# Patient Record
Sex: FEMALE | Race: BLACK OR AFRICAN AMERICAN | NOT HISPANIC OR LATINO | Employment: OTHER | URBAN - METROPOLITAN AREA
[De-identification: names, ages, dates, MRNs, and addresses within clinical notes are randomized per-mention and may not be internally consistent; named-entity substitution may affect disease eponyms.]

---

## 2020-02-05 ENCOUNTER — HOSPITAL ENCOUNTER (EMERGENCY)
Facility: CLINIC | Age: 35
Discharge: HOME | End: 2020-02-05
Attending: EMERGENCY MEDICINE | Admitting: EMERGENCY MEDICINE
Payer: MEDICAID

## 2020-02-05 VITALS
BODY MASS INDEX: 30.73 KG/M2 | RESPIRATION RATE: 15 BRPM | SYSTOLIC BLOOD PRESSURE: 104 MMHG | OXYGEN SATURATION: 98 % | TEMPERATURE: 98.4 F | DIASTOLIC BLOOD PRESSURE: 66 MMHG | HEIGHT: 64 IN | WEIGHT: 180 LBS | HEART RATE: 56 BPM

## 2020-02-05 DIAGNOSIS — M54.2 TENDERNESS OF NECK: ICD-10-CM

## 2020-02-05 LAB
ALBUMIN UR-MCNC: 10 MG/DL
APPEARANCE UR: CLEAR
BILIRUB UR QL STRIP: NEGATIVE
COLOR UR AUTO: YELLOW
GLUCOSE UR STRIP-MCNC: NEGATIVE MG/DL
HCG UR QL: NEGATIVE
HGB UR QL STRIP: ABNORMAL
KETONES UR STRIP-MCNC: NEGATIVE MG/DL
LEUKOCYTE ESTERASE UR QL STRIP: NEGATIVE
MUCOUS THREADS #/AREA URNS LPF: PRESENT /LPF
NITRATE UR QL: NEGATIVE
PH UR STRIP: 6 PH (ref 5–7)
RBC #/AREA URNS AUTO: 7 /HPF (ref 0–2)
SOURCE: ABNORMAL
SP GR UR STRIP: 1.03 (ref 1–1.03)
UROBILINOGEN UR STRIP-MCNC: 2 MG/DL (ref 0–2)
WBC #/AREA URNS AUTO: 1 /HPF (ref 0–5)

## 2020-02-05 PROCEDURE — 76536 US EXAM OF HEAD AND NECK: CPT

## 2020-02-05 PROCEDURE — 81025 URINE PREGNANCY TEST: CPT | Performed by: EMERGENCY MEDICINE

## 2020-02-05 PROCEDURE — 81001 URINALYSIS AUTO W/SCOPE: CPT | Performed by: EMERGENCY MEDICINE

## 2020-02-05 PROCEDURE — 99284 EMERGENCY DEPT VISIT MOD MDM: CPT | Mod: 25

## 2020-02-05 PROCEDURE — 25000132 ZZH RX MED GY IP 250 OP 250 PS 637: Performed by: EMERGENCY MEDICINE

## 2020-02-05 RX ORDER — LIDOCAINE 50 MG/G
1 PATCH TOPICAL EVERY 24 HOURS
Qty: 10 PATCH | Refills: 0 | Status: SHIPPED | OUTPATIENT
Start: 2020-02-05 | End: 2020-04-13

## 2020-02-05 RX ORDER — LIDOCAINE 4 G/G
1 PATCH TOPICAL
Status: DISCONTINUED | OUTPATIENT
Start: 2020-02-05 | End: 2020-02-06 | Stop reason: HOSPADM

## 2020-02-05 RX ADMIN — LIDOCAINE 1 PATCH: 560 PATCH PERCUTANEOUS; TOPICAL; TRANSDERMAL at 23:16

## 2020-02-05 ASSESSMENT — ENCOUNTER SYMPTOMS
FEVER: 0
CHILLS: 1
NAUSEA: 1
BACK PAIN: 1

## 2020-02-05 ASSESSMENT — MIFFLIN-ST. JEOR: SCORE: 1506.47

## 2020-02-05 NOTE — LETTER
February 5, 2020      To Whom It May Concern:      Carlitos Melendez was seen in our Emergency Department today, 02/05/20.  I expect her condition to improve over the next day.  She may return to work/school when improved.    Sincerely,        Bri CHRISTIANSEN RN

## 2020-02-05 NOTE — ED AVS SNAPSHOT
Emergency Department  [address] [facility] [address]  Phone:  [phone]  Fax:  [phone]                                    Carlitos Melendez   MRN: [id number]    Department:   Emergency Department   Date of Visit:  2/5/2020           After Visit Summary Signature Page    I have received my discharge instructions, and my questions have been answered. I have discussed any challenges I see with this plan with the nurse or doctor.    ..........................................................................................................................................  Patient/Patient Representative Signature      ..........................................................................................................................................  Patient Representative Print Name and Relationship to Patient    ..................................................               ................................................  Date                                   Time    ..........................................................................................................................................  Reviewed by Signature/Title    ...................................................              ..............................................  Date                                               Time          22EPIC Rev 08/18

## 2020-02-06 NOTE — ED TRIAGE NOTES
Patient complaints of abdominal pain for years & back pain.  Feels like a knot in her back.  Burning pain to upper back.  Denies injury.  States pain to back has been off & on for along time also.

## 2020-02-06 NOTE — ED PROVIDER NOTES
"  History     Chief Complaint:  Back Pain     HPI   Carlitos Melendez is a 35 year old female who presents with back pain. The patient has had worsened upper back pain over the past couple days that may be partially attributed to working hard lately at her job at [employer]. Ibuprofen and Tylenol don't seem to help her pain, and she has not had injury to the area. The patient also complains of chills, intermittent nausea, and getting her menstrual period twice this month. She denies fevers as well as drug or alcohol use.     Allergies:  NKDA     Medications:    The patient is currently on no regular medications.      Past Medical History:    The patient denies any significant past medical history.    Past Surgical History:    The patient does not have any pertinent past surgical history  Family History:    No past pertinent family history.      Social History:  Tobacco use: negative   Alcohol use: negative   PCP: No primary care provider on file.     Review of Systems   Constitutional: Positive for chills. Negative for fever.   Gastrointestinal: Positive for nausea.   Musculoskeletal: Positive for back pain.   All other systems reviewed and are negative.      Physical Exam     Patient Vitals for the past 24 hrs:   BP Temp Temp src Pulse Heart Rate Resp SpO2 Height Weight   02/05/20 2300 104/66 -- -- 56 -- 15 98 % -- --   02/05/20 2107 122/58 98.4  F (36.9  C) Oral 73 73 16 98 % 1.626 m (5' 4\") 81.6 kg (180 lb)        Physical Exam  Constitutional: Alert, attentive, GCS 15  HENT:    Nose: Nose normal.    Mouth/Throat: Oropharynx is clear, mucous membranes are moist  Eyes: EOM are normal, anicteric, conjugate gaze  CV: regular rate and rhythm; no murmurs  Neck: superficial tenderness of the lower cervical spine, range of motion normal,  strength normal.   Chest: Effort normal and breath sounds clear without wheezing or rales, symmetric bilaterally   GI:  non tender. No distension. No guarding or rebound.    MSK: No " LE edema, no tenderness to palpation of BLE.  Neurological: Alert, attentive, moving all extremities equally.   Skin: Skin is warm and dry.     Emergency Department Course     Imaging:  Radiology findings were communicated with the patient who voiced understanding of the findings.    POC US SOFT TISSUE   Final Result   Procedure Note       Emergency Medicine Limited Ultrasound: Abscess      PERFORMED BY: José Miguel Lau MD   INDICATIONS/SYMPTOM:  Soft tissue swelling, pain. Evaluate for abscess   PROBE: Linear probe   BODY LOCATION: The ultrasound was performed Dr. Lau.   FINDINGS:  No fluid collection was demonstrated in the soft tissues measuring or cobblestoning      INTERPRETATION: No abscess or cellulitis.    IMAGE DOCUMENTATION: Images saved digitally to US machine hard-drive/PACS.                  Laboratory:  Laboratory findings were communicated with the patient who voiced understanding of the findings.    UA with microscopic: trace of blood, protein albumin 10, RBC 7 (H), mucous present, o/w WNL  HCG qualitative urine: negative     Interventions:  2316 Lidocaine 4% patch Transdermal     Emergency Department Course:  Past medical records, nursing notes, and vitals reviewed.    2227 I performed an exam of the patient as documented above.     The patient provided a urine sample here in the emergency department. This was sent for laboratory testing, findings above.    2250 Patient rechecked and updated.  Bedside ultrasound performed.     I personally reviewed the laboratory and imaging results with the Patient and answered all related questions prior to discharge. I prescribed lidocaine patches.      Impression & Plan     Medical Decision Making:  Carlitos Melendez is a 35 year old female no significant past medical history presenting for evaluation of several years posterior neck pain that is atraumatic.  This is superficial within the skin and she denies any deeper neck pain or headache.  She has no  paresthesias in her hands.  She is not an IV drug user and I do not suspect bony or spinal cord pathology.  I did perform a point-of-care ultrasound of the soft tissue which shows no abscess no fluid collection.  She really just appears to be hyperesthetic in her skin.  I see no indication for further imaging.  I recommended continuation of Tylenol, ibuprofen trial of Lidoderm patch and icing.  Return precautions were reviewed and she was given referral to PCP.      Discharge Diagnosis:    ICD-10-CM    1. Tenderness of neck M54.2      Disposition:  Discharged to home     Discharge Medications:  Discharge Medication List as of 2/5/2020 11:08 PM      START taking these medications    Details   lidocaine (LIDODERM) 5 % patch Place 1 patch onto the skin every 24 hours for 10 daysDisp-10 patch, R-0Local Print           José Miguel Lau MD  [employer]  11:41 PM 02/05/20     Scribe Disclosure:  I, Esperanzasa Gibbons, am serving as a scribe at 9:57 PM on 2/5/2020 to document services personally performed by José Miguel Lau MD based on my observations and the provider's statements to me.       José Miguel Lau MD  02/05/20 1330

## 2020-02-10 ENCOUNTER — HOSPITAL ENCOUNTER (EMERGENCY)
Facility: CLINIC | Age: 35
Discharge: HOME | End: 2020-02-10
Attending: EMERGENCY MEDICINE | Admitting: EMERGENCY MEDICINE
Payer: MEDICAID

## 2020-02-10 VITALS
TEMPERATURE: 97.8 F | SYSTOLIC BLOOD PRESSURE: 115 MMHG | DIASTOLIC BLOOD PRESSURE: 60 MMHG | WEIGHT: 180 LBS | OXYGEN SATURATION: 99 % | RESPIRATION RATE: 14 BRPM | HEART RATE: 60 BPM | BODY MASS INDEX: 30.73 KG/M2 | HEIGHT: 64 IN

## 2020-02-10 DIAGNOSIS — M54.6 ACUTE RIGHT-SIDED THORACIC BACK PAIN: ICD-10-CM

## 2020-02-10 PROCEDURE — 25000132 ZZH RX MED GY IP 250 OP 250 PS 637: Performed by: EMERGENCY MEDICINE

## 2020-02-10 PROCEDURE — 99283 EMERGENCY DEPT VISIT LOW MDM: CPT

## 2020-02-10 RX ORDER — CYCLOBENZAPRINE HCL 10 MG
10 TABLET ORAL 3 TIMES DAILY PRN
Qty: 15 TABLET | Refills: 0 | Status: SHIPPED | OUTPATIENT
Start: 2020-02-10 | End: 2020-04-13

## 2020-02-10 RX ORDER — ACETAMINOPHEN 325 MG/1
975 TABLET ORAL ONCE
Status: COMPLETED | OUTPATIENT
Start: 2020-02-10 | End: 2020-02-10

## 2020-02-10 RX ORDER — IBUPROFEN 600 MG/1
600 TABLET, FILM COATED ORAL ONCE
Status: COMPLETED | OUTPATIENT
Start: 2020-02-10 | End: 2020-02-10

## 2020-02-10 RX ORDER — METHYLPREDNISOLONE 4 MG
TABLET, DOSE PACK ORAL
Qty: 21 TABLET | Refills: 0 | Status: SHIPPED | OUTPATIENT
Start: 2020-02-10 | End: 2020-04-13

## 2020-02-10 RX ADMIN — ACETAMINOPHEN 975 MG: 325 TABLET, FILM COATED ORAL at 12:04

## 2020-02-10 RX ADMIN — IBUPROFEN 600 MG: 600 TABLET ORAL at 12:04

## 2020-02-10 ASSESSMENT — ENCOUNTER SYMPTOMS
SHORTNESS OF BREATH: 0
BACK PAIN: 1
CHILLS: 0
FEVER: 0
COUGH: 0

## 2020-02-10 ASSESSMENT — MIFFLIN-ST. JEOR: SCORE: 1506.47

## 2020-02-10 NOTE — ED TRIAGE NOTES
Patient reports she was here for same a few days ago for back pain. States medications are not working. Took tylenol last night at 1030pm.

## 2020-02-10 NOTE — ED PROVIDER NOTES
"  History   Chief Complaint:  Back Pain    HPI   Carlitos Melendez is a 35 year old female who presents with back pain.  She was seen here several days ago for back pain.  Her pain improved with the lidocaine patch that was provided but now she is having pain further down in her back on the right side.  She notes pain is severe and holding her head in certain positions makes the pain worse.  She denies any numbness or tingling in her arm or leg.  She is not had any fevers and denies being an IV drug user.  She is been using Tylenol and ibuprofen at home for symptom control without relief.  Denies any bowel or bladder incontinence.  She has been up and ambulatory and works at [employer]    Allergies:  Penicillins    Medications:    Lidoderm    Past Medical History:    History reviewed.  No pertinent past medical history.    Past Surgical History:    History reviewed. No pertinent surgical history.    Family History:    History reviewed. No pertinent family history.     Social History:  PCP: Brandi Garcia  Marital Status:  Single [1]    Review of Systems   Constitutional: Negative for chills and fever.   Respiratory: Negative for cough and shortness of breath.    Musculoskeletal: Positive for back pain.     Physical Exam     Patient Vitals for the past 24 hrs:   BP Temp Temp src Pulse Heart Rate Resp SpO2 Height Weight   02/10/20 1200 115/60 97.8  F (36.6  C) Temporal 60 60 14 99 % 1.626 m (5' 4\") 81.6 kg (180 lb)     Physical Exam  Eyes:  The pupils are equal and round    Conjunctivae and sclerae are normal  ENT:    The nose is normal    Pinnae are normal    The oropharynx is normal  Neck:  Normal range of motion    There is no rigidity noted  CV:  Regular rate and rhythm     No edema  Resp:  Lungs are clear    Non-labored    No rales    No wheezing   GI:  Abdomen is soft, there is no rigidity    No distension    No rebound tenderness   MS:  Normal muscular tone    No asymmetric leg swelling    Tenderness " of the right thoracic para-spinous muscles with palpable spasm  Skin:  No rash or acute skin lesions noted  Neuro:   Awake, alert.      Speech is normal and fluent.    Face is symmetric.     Moves all extremities    SILT in the bilateral lower extremities    Normal dorsiflexion and plantarflexion bilaterally    Normal  strength bilaterally       Emergency Department Course   Interventions:  1204: Ibuprofen 600 mg PO   1204: Tylenol 975 mg PO    Emergency Department Course:  Past medical records, nursing notes, and vitals reviewed.    1245 I performed an exam of the patient as documented above.     Findings and plan explained to the Patient. Patient discharged home with instructions regarding supportive care, medications, and reasons to return. The importance of close follow-up was reviewed.    Impression & Plan   Medical Decision Making:  Carlitos Melendez is a 35 year old female who presents the emergency department with right-sided thoracic back pain.  She was seen here several days ago with pain.  Pain improved but now is present in the more lower thoracic area.  On exam she seems to have palpable tenderness and spasm in the right paraspinous muscles.  She has no focal neurologic symptoms.  No fevers are present.  She is not an IV drug user.  No urinary symptoms.  No difficulty breathing.  Suspect musculoskeletal cause of her pain.  She is given ibuprofen and Tylenol in triage.  Will prescribe Flexeril and steroids to see if they can help calm her pain.  She is to follow-up with her doctor.  Return with any new or concerning symptoms.    Diagnosis:    ICD-10-CM    1. Acute right-sided thoracic back pain M54.6      Disposition:  Discharged to home.    Discharge Medications:  Discharge Medication List as of 2/10/2020 12:57 PM      START taking these medications    Details   cyclobenzaprine (FLEXERIL) 10 MG tablet Take 1 tablet (10 mg) by mouth 3 times daily as needed for muscle spasms, Disp-15 tablet, R-0,  E-Prescribe      methylPREDNISolone (MEDROL DOSEPAK) 4 MG tablet therapy pack Follow Package Directions, Disp-21 tablet, R-0, E-Prescribe           Scribe Disclosure:  I, Brendan Kenney, am serving as a scribe at 12:45 PM on 2/10/2020 to document services personally performed by No att. providers found based on my observations and the provider's statements to me.        Garrett Newberry MD  02/10/20 1528

## 2020-02-15 ENCOUNTER — HOSPITAL ENCOUNTER (EMERGENCY)
Facility: CLINIC | Age: 35
Discharge: HOME | End: 2020-02-15
Attending: NURSE PRACTITIONER | Admitting: NURSE PRACTITIONER
Payer: MEDICAID

## 2020-02-15 VITALS
BODY MASS INDEX: 30.04 KG/M2 | SYSTOLIC BLOOD PRESSURE: 130 MMHG | HEART RATE: 64 BPM | RESPIRATION RATE: 18 BRPM | TEMPERATURE: 98.5 F | OXYGEN SATURATION: 100 % | DIASTOLIC BLOOD PRESSURE: 71 MMHG | WEIGHT: 175 LBS

## 2020-02-15 DIAGNOSIS — K64.4 EXTERNAL HEMORRHOIDS: ICD-10-CM

## 2020-02-15 PROCEDURE — 99282 EMERGENCY DEPT VISIT SF MDM: CPT

## 2020-02-15 ASSESSMENT — ENCOUNTER SYMPTOMS
ABDOMINAL PAIN: 0
RECTAL PAIN: 1
FEVER: 0
DYSURIA: 0
BLOOD IN STOOL: 0

## 2020-02-15 NOTE — LETTER
February 15, 2020      To Whom It May Concern:      Carlitos Melendez was seen in our Emergency Department today, 02/15/20.     Sincerely,        CARLOS Serna CNP

## 2020-02-15 NOTE — ED AVS SNAPSHOT
Emergency Department  [address] [facility] [address]  Phone:  [phone]  Fax:  [phone]                                    Carlitos Melendez   MRN: [id number]    Department:   Emergency Department   Date of Visit:  2/15/2020           After Visit Summary Signature Page    I have received my discharge instructions, and my questions have been answered. I have discussed any challenges I see with this plan with the nurse or doctor.    ..........................................................................................................................................  Patient/Patient Representative Signature      ..........................................................................................................................................  Patient Representative Print Name and Relationship to Patient    ..................................................               ................................................  Date                                   Time    ..........................................................................................................................................  Reviewed by Signature/Title    ...................................................              ..............................................  Date                                               Time          22EPIC Rev 08/18

## 2020-02-16 NOTE — DISCHARGE INSTRUCTIONS
Continued with Ibuprofen and Hemorrhoid cream. Use stool softener such as Senna.  Soft cushion/pillow to sit on.

## 2020-02-16 NOTE — ED PROVIDER NOTES
History     Chief Complaint:  Rectal Problem      HPI   Carlitos Melendez is a 35 year old female who presents with rectal pain.  She noticed today after lifting heavy things at work that she had a hemorrhoid.  Due to continued discomfort and after searching the Internet for possibility she was concerned and presented for evaluation.  She has had no bleeding with the area.  She has applied preparation H cream.  She has never had a hemorrhoid before.  She has no other concerns or complaints at this time.    Allergies:  Penicillins     Medications:    Flexeril  Lidoderm  Medrol dosepak    Past Medical History:    The patient denies any significant past medical history.    Past Surgical History:    The patient does not have any pertinent past surgical history.    Family History:    No past pertinent family history.    Social History:  Tobacco use - unknown.  Alcohol use - unknown.  Drug use - unknown.  Marital Status:  Single [1]     Review of Systems   Constitutional: Negative for fever.   Gastrointestinal: Positive for rectal pain. Negative for abdominal pain and blood in stool.   Genitourinary: Negative for dysuria.   All other systems reviewed and are negative.        Physical Exam     Patient Vitals for the past 24 hrs:   BP Temp Temp src Pulse Resp SpO2 Weight   02/15/20 1755 130/71 98.5  F (36.9  C) Temporal 64 18 100 % 79.4 kg (175 lb)     Physical Exam  General: Alert, mild discomfort, well kept   HENT:  Normal voice, No lymphadenopathy  Eyes:  The pupils are equal, round, and reactive to light, Conjunctiva normal, No scleral icterus   Neck:  Normal range of motion  CV:  Normal Pulses  Resp:  Non-labored, No cough  Abd:  Large approximately 2-1/2 to 3 cm diameter hemorrhoid approximately 1-2 o'clock.  Tender.  Does not appear to be thrombosed.  No bleeding.  MS:  Normal muscular tone, moves all extremities  Skin:  No rash or acute skin lesions noted  Neuro: Speech is normal and fluent  Psych:  Awake. Alert.   Normal affect.  Appropriate interactions. Good eye contact      Emergency Department Course       Interventions:  Medications - No data to display    Emergency Department Course:  Past medical records, nursing notes, and vitals reviewed.    Recheck.  I discussed the physical finds and lack of indication for further testing.  The patient understands. The patient will be discharged home to follow up with primary care doctor per discharge instructions.  Indications for return to the ED were discussed and the patient understands.  All questions were answered prior to discharge.     Impression & Plan     Medical Decision Making:  Carlitos Melendez is a 35 year old female who presents today for evaluation of rectal pain.  She developed what she thought was a hemorrhoid today at work after lifting heavy objects.  However due to discomfort and concern for possibility of a cancerous cause for her rectal pain she presented for evaluation.  Her evaluation shows a large simple hemorrhoid.  She is advised on appropriate use of hemorrhoid cream and wipes.  Also discussed stool softener use.  Will also use anti-inflammatories and a cushion.  There is no indication for further testing or imaging at this time.  This appears to be a simple hemorrhoid.  Patient is advised to follow-up with colorectal surgery.  No other concerns or complaints at this time.    Diagnosis:    ICD-10-CM    1. External hemorrhoids K64.4        Disposition:  Discharged to home.    Discharge Medications:  New Prescriptions    No medications on file     CARLOS Serna CNP, Jason David, APRN CNP  02/15/20 6273

## 2020-04-13 ENCOUNTER — VIRTUAL VISIT (OUTPATIENT)
Dept: URGENT CARE | Facility: CLINIC | Age: 35
End: 2020-04-13
Payer: COMMERCIAL

## 2020-04-13 ENCOUNTER — NURSE TRIAGE (OUTPATIENT)
Dept: NURSING | Facility: CLINIC | Age: 35
End: 2020-04-13

## 2020-04-13 DIAGNOSIS — R10.33 PERIUMBILICAL ABDOMINAL PAIN: ICD-10-CM

## 2020-04-13 DIAGNOSIS — Z20.822 SUSPECTED COVID-19 VIRUS INFECTION: Primary | ICD-10-CM

## 2020-04-13 DIAGNOSIS — R19.7 DIARRHEA, UNSPECIFIED TYPE: ICD-10-CM

## 2020-04-13 PROCEDURE — 99214 OFFICE O/P EST MOD 30 MIN: CPT | Mod: TEL | Performed by: STUDENT IN AN ORGANIZED HEALTH CARE EDUCATION/TRAINING PROGRAM

## 2020-04-13 SDOH — HEALTH STABILITY: MENTAL HEALTH: HOW OFTEN DO YOU HAVE A DRINK CONTAINING ALCOHOL?: NOT ASKED

## 2020-04-13 NOTE — PATIENT INSTRUCTIONS
Instructions for Patients  Your symptoms could be due to COVID-19, but it also could be due to a number of other respiratory illnesses.  We are not doing testing at this time for COVID-19 unless symptoms are severe enough to require hospitalization.  It is recommended that you stay home to prevent the spread of your illness.  To do this follow these instructions:    Regardless of if you have been tested or not:  Patient who have symptoms (cough, fever, or shortness of breath), need to isolate for 7 days from when symptoms started AND 72 hours after fever resolves (without fever reducing medications) AND improvement of respiratory symptoms (whichever is longer).      Isolate yourself at home (in own room/own bathroom if possible)    Do Not allow any visitors    Do Not go to work or school    Do Not go to [employer],  centers, shopping, or other public places.    Do Not shake hands.    Avoid close and intimate contact with others (hugging, kissing).    Follow CDC recommendations for household cleaning of frequently touched services.     After the initial 7 days, continue to isolate yourself from household members as much as possible. To continue decrease the risk of community spread and exposure, you and any members of your household should limit activities in public for 14 days after starting home isolation.     You can reference the following CDC link for helpful home isolation/care tips:  https://www.cdc.gov/coronavirus/2019-ncov/downloads/10Things.pdf    Protect Others:    Cover your mouth and nose with a mask, disposable tissue or wash cloth to avoid spreading germs to others.    Wash your hands and face frequently with soap and water.    Fever Medicines:    For fever relief, take acetaminophen or ibuprofen.    Treat fevers above 101  F (38.3  C) to lower fevers and make you more comfortable.     Acetaminophen (e.g., Tylenol): Take 650 mg (two 325 mg pills) by mouth every 4-6 hours as needed of regular  strength Tylenol or 1,000 mg (two 500 mg pills) every 8 hours as needed of Extra Strength Tylenol.     Ibuprofen (e.g., Motrin, Advil): Take 400 mg (two 200 mg pills) by mouth every 6 hours as needed.     Acetaminophen is thought to be safer than ibuprofen or naproxen for people over 65 years old. Acetaminophen is in many OTC and prescription medicines. It might be in more than one medicine that you are taking. You need to be careful and not take an overdose. Before taking any medicine, read all the instructions on the package.    Caution - NSAIDs (e.g., ibuprofen, naproxen): Do not take nonsteroidal anti-inflammatory drugs (NSAIDs) if you have stomach problems, kidney disease, heart failure, or other contraindications to using this type of medicine. Do not take NSAID medicines for over 7 days without consulting your PCP. Do not take NSAID medicines if you are pregnant. Do not take NSAID medicines if you are also taking blood thinners.     Call Back If: Breathing difficulty develops or you become worse.    Get [employer] Loop Information  We know it can be scary to hear that you might have COVID-19. Our team can help track your symptoms and make sure you are doing ok over the next two weeks using a program called [employer] to keep in touch. When you receive an email from [employer], please consider enrolling in our monitoring program. There is no cost to you for monitoring. Here is a URL where you can learn more: [email or web address]      Thank you for limiting contact with others, wearing a simple mask to cover your cough, practice good hand hygiene habits and accessing our virtual services where possible to limit the spread of this virus.    For more information about COVID19 and options for caring for yourself at home, please visit the CDC website at https://www.cdc.gov/coronavirus/2019-ncov/about/steps-when-sick.html  For more options for care at [facility], please visit our website at  [email or web address]

## 2020-04-13 NOTE — PROGRESS NOTES
I was present during the key/critical portion of the telephone visit. The patient acknowledged that I participated in the telephone conversation. I discussed the case with the resident and agree with the note as documented by the resident.    I personally spent a total of 3-5 minutes speaking with Carlitos Melendez during today s visit.     Tirso Merritt MD  4/13/2020 at 4:20 PM        [facility]  Department of Family Medicine and [facility]

## 2020-04-13 NOTE — TELEPHONE ENCOUNTER
"COVID 19 Nurse Triage Plan/Patient Instructions    Please be aware that novel coronavirus (COVID-19) may be circulating in the community. If you develop symptoms such as fever, cough, or SOB or if you have concerns about the presence of another infection including coronavirus (COVID-19), please contact your health care provider or visit [email or web address].     Disposition/Instructions    Patient to have an Urgent Care Telephone Visit with a provider. Follow System Ambulatory Workflow for COVID 19.     Urgent Care Telephone Visits are available between the hours of 8 am to 9 pm. Staff will assist patent in scheduling an appointment for this Urgent Care Telephone Visit.     Call Back If: Your symptoms worsen before you are able to complete your Urgent Care Telephone Visit with a provider.    Thank you for limiting contact with others, wearing a simple mask to cover your cough, practice good hand hygiene habits and accessing our virtual services where possible to limit the spread of this virus.    For more information about COVID19 and options for caring for yourself at home, please visit the CDC website at https://www.cdc.gov/coronavirus/2019-ncov/about/steps-when-sick.html  For more options for care at [facility], please visit our website at [email or web address]    For more information, please use the [facility] ([facility]) COVID-19 Hotlines (Interpreters available):     Health questions: Phone Number: [phone] or [phone] and Hours: 7 a.m. to 7 p.m.    Schools and  questions: Phone Number: [phone] or [phone] and Hours 7 a.m. to 7 p.m.    Patient complains of dry cough and rash.  Also has had \"the runs\".  Patient has history of asthma.  Works at [employer].  Tried using [employer] prior to calling [facility] and was unsuccessful.  Transferred to scheduling for Telephone Visit with  Provider.    Reason for Disposition    [1] Continuous (nonstop) " coughing interferes with work or school AND [2] no improvement using cough treatment per protocol    Additional Information    Negative: SEVERE difficulty breathing (e.g., struggling for each breath, speaks in single words)    Negative: Difficult to awaken or acting confused (e.g., disoriented, slurred speech)    Negative: Bluish (or gray) lips or face now    Negative: Shock suspected (e.g., cold/pale/clammy skin, too weak to stand, low BP, rapid pulse)    Negative: Sounds like a life-threatening emergency to the triager    Negative: [1] COVID-19 suspected (e.g., cough, fever, shortness of breath) AND [2] public health department recommends testing    Negative: [1] COVID-19 exposure AND [2] no symptoms    Negative: COVID-19 and Breastfeeding, questions about    Negative: SEVERE or constant chest pain (Exception: mild central chest pain, present only when coughing)    Negative: MODERATE difficulty breathing (e.g., speaks in phrases, SOB even at rest, pulse 100-120)    Negative: Patient sounds very sick or weak to the triager    Negative: MILD difficulty breathing (e.g., minimal/no SOB at rest, SOB with walking, pulse <100)    Negative: Chest pain    Negative: Fever > 103 F (39.4 C)    Negative: [1] Fever > 101 F (38.3 C) AND [2] age > 60    Negative: [1] Fever > 100.0 F (37.8 C) AND [2] bedridden (e.g., nursing home patient, CVA, chronic illness, recovering from surgery)    Negative: HIGH RISK patient (e.g., age > 64 years, diabetes, heart or lung disease, weak immune system)    Negative: Fever present > 3 days (72 hours)    Negative: [1] Fever returns after gone for over 24 hours AND [2] symptoms worse or not improved    Protocols used: CORONAVIRUS (COVID-19) DIAGNOSED OR [id number] 3.30.20

## 2020-04-13 NOTE — PROGRESS NOTES
"  The patient has been notified of following:     \"This telephone visit will be conducted via a call between you and your physician/provider. We have found that certain health care needs can be provided without the need for a physical exam.  This service lets us provide the care you need with a short phone conversation.  If a prescription is necessary we can send it directly to your pharmacy.  If lab work is needed we can place an order for that and you can then stop by our lab to have the test done at a later time.    If during the course of the call the physician/provider feels a telephone visit is not appropriate, you will not be charged for this service.\"     Verbal consent given - yes    Subjective     CC: Carlitos Melendez  is a 35 year old female who presents to clinic today for the following health issues:   Chief Complaint   Patient presents with     Cough        History:  -Headaches - 1 week ago, across forehead, no radiation, waxes and wanes, lasts hours, not affected by light or sound  -Dry cough - 1 week ago, not affected by tylenol, associated runny nose and sinus congestion  -Stomach pains - 1 week ago, periumbilical, radiate down to pelvis, intermittent episodes, lasts minutes, not affected by tylenol or eating or positioning, no change in diet recently, associated with diarrhea and flatulence  -Diarrhea - 1 week ago, fluctuating consistency that includes liquid stool, at least 6 BMs per day, no black stool or bloody stools, associated with sweats and fevers prior to BM  -Admits to heartburn, insomnia, anxiousness  -no chills, orthostatic dizziness, palpitations, chest pain, vaginal bleeding, vaginal discharge, dysuria, anosmia   -LMP 3/19 (7 day menses, fluctuates in cycle length)    In the 14 days before your symptoms started, have you had close contact with a COVID-19 (Coronavirus) patient? 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 No.  Went to [facility] in beginning of March    Do you have a fever? No, I do not have a fever;     Are you having difficulty breathing? No    Do you have a cough? Yes.    Is your coughing worse when you are exposed to pollen, dust, or other things in the environment? Yes       Are you coughing up any mucus? No, it's a dry cough    Are you experiencing any of the following? Coughing more when lying down and Cough more after exercise    What other symptoms have you experienced? Runny Nose, Blocked Sinuses and Headache    Do you have any of the following? Sweating at night, Loss of appetite and Fatigue    Have you ever been diagnosed with asthma, bronchitis, or lung disease? Yes; asthma    Do you smoke? Yes; 1/2 - 1 pack per day    Are there people you know with similar symptoms? Yes     What was the patient(s) diagnosed with? unknown    Have you recently been hospitalized? No    Are you pregnant or breastfeeding?: no      Patient Active Problem List   Diagnosis     Suspected Covid-19 Virus Infection      Hx of cholecystitis    No pancreatitis, PUD, IBS, IBD, ectopic Past Surgical History:   Procedure Laterality Date     CHOLECYSTECTOMY  2008         Social History     Tobacco Use     Smoking status: Current Every Day Smoker     Packs/day: 1.00     Types: Cigarettes     Smokeless tobacco: Never Used   Substance Use Topics     Alcohol use: Never    Works at [employer] (, pharmacy tech)    6 kids at home    Sexually active - had sex since last period without protection Family History   Problem Relation Age of Onset     Crohn's Disease No family hx of      Ulcerative Colitis No family hx of         Not sure of IBD in family     No current outpatient medications on file.     Allergies   Allergen Reactions     Penicillins Hives       Reviewed and updated as needed this visit by Provider  Tobacco  Med Hx  Surg Hx  Fam Hx  Soc Hx       7-point ROS reviewed and negative unless otherwise noted in HPI      Objective    Gen: Patient is alert, oriented, no acute distress  Resp: Speaking full sentence, no audible shortness of breath.  No cough of wheeze.    Diagnostic Test Results:  Labs reviewed in Epic        Assessment/Plan:  1. Suspected Covid-19 Virus Infection  2. Periumbilical abdominal pain  3. Diarrhea, unspecified type  1-week history of dry cough, headache, fatigue, periumbilical abdominal pain, and diarrhea.  Pertinent history of asthma.  VS and exam limited by virtual visit in COVID-19 situation.  Most likely COVID-19 given symptoms and risk exposures.  Another likely diagnosis could be infectious diarrhea.  HPI does not fit with pancreatitis, PUD, IBD, ectopic pregnancy, SBO.  However, gave patient precautions to acute abdomen symptoms.  Instructed to follow up with PCP in 1 week.  Discussed COVID-19 quarantine protocols.  - COVID-19 GetWell Loop Referral; Future  -Follow up with PCP        Phone call duration:  >30 minutes    Ady Carrasco MD     Preceptor was Dr. Merritt

## 2020-04-22 ENCOUNTER — NURSE TRIAGE (OUTPATIENT)
Dept: NURSING | Facility: CLINIC | Age: 35
End: 2020-04-22

## 2020-04-22 NOTE — TELEPHONE ENCOUNTER
"Had a phone visit and they \"quarantined me\".     States that she has been fine for past 72 hours, but now this morning her cough came back, chills, stomach pains, feverish feeling, diarrhea, \"everything came back\"    Also notes that her significant other is ill with stomach/fever symptoms. \"He states he is fine\". (Refuses triage)    Patient told her job she was coming back to work today, she was supposed to open the store. But now is concerned about if it is back, she doesn't want to spread anything    Patient states that the employer has gotten everyone tested at her place of employment. They were all negative.    Advised patient to call employer and see if they are able to get her tested. Then patient should do [email or web address] visit to determine if she should go back to work, or the best course of action.       Reason for Disposition    1] COVID-19 infection diagnosed or suspected AND [2] mild symptoms (fever, cough) AND [2] no trouble breathing or other complications    COVID-19 Home Isolation, questions about    COVID-19 Testing, questions about    Additional Information    Negative: SEVERE difficulty breathing (e.g., struggling for each breath, speaks in single words)    Negative: Difficult to awaken or acting confused (e.g., disoriented, slurred speech)    Negative: Bluish (or gray) lips or face now    Negative: Shock suspected (e.g., cold/pale/clammy skin, too weak to stand, low BP, rapid pulse)    Negative: Sounds like a life-threatening emergency to the triager    Negative: [1] COVID-19 suspected (e.g., cough, fever, shortness of breath) AND [2] public health department recommends testing    Negative: [1] COVID-19 exposure AND [2] no symptoms    Negative: COVID-19 and Breastfeeding, questions about    Negative: SEVERE or constant chest pain (Exception: mild central chest pain, present only when coughing)    Negative: MODERATE difficulty breathing (e.g., speaks in phrases, SOB even at rest, pulse 100-120)    " Negative: Patient sounds very sick or weak to the triager    Negative: MILD difficulty breathing (e.g., minimal/no SOB at rest, SOB with walking, pulse <100)    Negative: Chest pain    Negative: Fever > 103 F (39.4 C)    Negative: [1] Fever > 101 F (38.3 C) AND [2] age > 60    Negative: [1] Fever > 100.0 F (37.8 C) AND [2] bedridden (e.g., nursing home patient, CVA, chronic illness, recovering from surgery)    Negative: HIGH RISK patient (e.g., age > 64 years, diabetes, heart or lung disease, weak immune system)    Negative: Fever present > 3 days (72 hours)    Negative: [1] Fever returns after gone for over 24 hours AND [2] symptoms worse or not improved    Negative: [1] Continuous (nonstop) coughing interferes with work or school AND [2] no improvement using cough treatment per protocol    Negative: Cough present > 3 weeks    Protocols used: CORONAVIRUS (COVID-19) DIAGNOSED OR [id number] 3.30.20    Ivett Downs RN on 4/22/2020 at 6:04 AM

## 2020-12-13 ENCOUNTER — HEALTH MAINTENANCE LETTER (OUTPATIENT)
Age: 35
End: 2020-12-13

## 2021-09-26 ENCOUNTER — HEALTH MAINTENANCE LETTER (OUTPATIENT)
Age: 36
End: 2021-09-26

## 2021-12-02 ENCOUNTER — HOSPITAL ENCOUNTER (EMERGENCY)
Facility: CLINIC | Age: 36
Discharge: LEFT BEFORE DISCHARGE | End: 2021-12-02
Attending: PHYSICIAN ASSISTANT | Admitting: PHYSICIAN ASSISTANT
Payer: COMMERCIAL

## 2021-12-02 VITALS
WEIGHT: 179 LBS | HEIGHT: 65 IN | RESPIRATION RATE: 14 BRPM | HEART RATE: 87 BPM | SYSTOLIC BLOOD PRESSURE: 108 MMHG | OXYGEN SATURATION: 94 % | BODY MASS INDEX: 29.82 KG/M2 | DIASTOLIC BLOOD PRESSURE: 69 MMHG | TEMPERATURE: 98.3 F

## 2021-12-02 DIAGNOSIS — U07.1 INFECTION DUE TO 2019 NOVEL CORONAVIRUS: ICD-10-CM

## 2021-12-02 DIAGNOSIS — N39.0 URINARY TRACT INFECTION WITH HEMATURIA, SITE UNSPECIFIED: ICD-10-CM

## 2021-12-02 DIAGNOSIS — R31.9 URINARY TRACT INFECTION WITH HEMATURIA, SITE UNSPECIFIED: ICD-10-CM

## 2021-12-02 LAB
ALBUMIN UR-MCNC: 20 MG/DL
APPEARANCE UR: ABNORMAL
BILIRUB UR QL STRIP: NEGATIVE
COLOR UR AUTO: ABNORMAL
FLUAV RNA SPEC QL NAA+PROBE: NEGATIVE
FLUBV RNA RESP QL NAA+PROBE: NEGATIVE
GLUCOSE UR STRIP-MCNC: NEGATIVE MG/DL
HCG UR QL: NEGATIVE
HGB UR QL STRIP: NEGATIVE
KETONES UR STRIP-MCNC: NEGATIVE MG/DL
LEUKOCYTE ESTERASE UR QL STRIP: ABNORMAL
MUCOUS THREADS #/AREA URNS LPF: PRESENT /LPF
NITRATE UR QL: NEGATIVE
PH UR STRIP: 7 [PH] (ref 5–7)
RBC URINE: 7 /HPF
SARS-COV-2 RNA RESP QL NAA+PROBE: POSITIVE
SP GR UR STRIP: 1.02 (ref 1–1.03)
SQUAMOUS EPITHELIAL: 2 /HPF
UROBILINOGEN UR STRIP-MCNC: NORMAL MG/DL
WBC URINE: 118 /HPF

## 2021-12-02 PROCEDURE — 87636 SARSCOV2 & INF A&B AMP PRB: CPT | Performed by: PHYSICIAN ASSISTANT

## 2021-12-02 PROCEDURE — C9803 HOPD COVID-19 SPEC COLLECT: HCPCS

## 2021-12-02 PROCEDURE — 81025 URINE PREGNANCY TEST: CPT | Performed by: PHYSICIAN ASSISTANT

## 2021-12-02 PROCEDURE — 99283 EMERGENCY DEPT VISIT LOW MDM: CPT

## 2021-12-02 PROCEDURE — 87086 URINE CULTURE/COLONY COUNT: CPT | Performed by: PHYSICIAN ASSISTANT

## 2021-12-02 PROCEDURE — 81001 URINALYSIS AUTO W/SCOPE: CPT | Performed by: PHYSICIAN ASSISTANT

## 2021-12-02 RX ORDER — NITROFURANTOIN 25; 75 MG/1; MG/1
100 CAPSULE ORAL 2 TIMES DAILY
Qty: 14 CAPSULE | Refills: 0 | Status: SHIPPED | OUTPATIENT
Start: 2021-12-02 | End: 2022-01-25

## 2021-12-02 ASSESSMENT — ENCOUNTER SYMPTOMS
COUGH: 0
BACK PAIN: 1
WEAKNESS: 0
NUMBNESS: 0
CHILLS: 0
NAUSEA: 0
FEVER: 0
VOMITING: 0
ABDOMINAL PAIN: 0
DIARRHEA: 1
SORE THROAT: 0
SHORTNESS OF BREATH: 0

## 2021-12-02 ASSESSMENT — MIFFLIN-ST. JEOR: SCORE: 1512.82

## 2021-12-02 NOTE — ED PROVIDER NOTES
History     Chief Complaint:  Dysuria       37 y/o female presents for evaluation of urinary irritative complaints (primarily urgency) for the past few days, concerning her for UTI.  She also notes back pain although this is a chronic issue however has felt a bit worse for the past few days.  She has a Hx of ureteral stones that requires surgery in 2009, none since.      She also notes nasal congestion and sons were sent home from school today due to symptoms of loss of taste and smell.  Sons are being tested tomorrow.     No F/C  No CP/dyspnea  No cough  No abd pain  No N/V  One bout of diarrhea yesterday  No loss of B/B control  No N/T/W to extremities    Remote stone Hx  No Hx of DM  Not breast feeding  Prior dread    Local resident  PCP established    The history is provided by the patient and medical records. No  was used.      ROS:  Review of Systems   Constitutional: Negative for chills and fever.   HENT: Positive for congestion. Negative for sore throat.    Respiratory: Negative for cough and shortness of breath.    Cardiovascular: Negative for chest pain.   Gastrointestinal: Positive for diarrhea. Negative for abdominal pain, nausea and vomiting.   Genitourinary: Positive for urgency.   Musculoskeletal: Positive for back pain.   Neurological: Negative for weakness and numbness.   All other systems reviewed and are negative.     Allergies:  Penicillins     Medications:    No current outpatient medications on file.    Past Medical History:    Past Medical History:   Diagnosis Date     Mild intermittent asthma      Patient Active Problem List   Diagnosis     Suspected COVID-19 virus infection      Past Surgical History:    Past Surgical History:   Procedure Laterality Date     CHOLECYSTECTOMY  2008      Family History:    family history is not on file.    Social History:   reports that she has been smoking cigarettes. She has been smoking about 1.00 pack per day. She has never used  "smokeless tobacco. She reports that she does not drink alcohol and does not use drugs.  PCP: [facility], [facility]     Physical Exam     Patient Vitals for the past 24 hrs:   BP Temp Temp src Pulse Resp SpO2 Height Weight   12/02/21 1532 108/69 98.3  F (36.8  C) Temporal 87 14 94 % 1.651 m (5' 5\") 81.2 kg (179 lb)      Physical Exam  Vitals and nursing note reviewed.   Constitutional:       General: She is not in acute distress.     Appearance: Normal appearance. She is not ill-appearing, toxic-appearing or diaphoretic.   HENT:      Head: Normocephalic.      Right Ear: External ear normal.      Left Ear: External ear normal.      Mouth/Throat:      Comments: Mask in place   Eyes:      Extraocular Movements: Extraocular movements intact.      Conjunctiva/sclera: Conjunctivae normal.   Cardiovascular:      Rate and Rhythm: Normal rate and regular rhythm.      Pulses: Normal pulses.      Heart sounds: Normal heart sounds.   Pulmonary:      Effort: Pulmonary effort is normal. No respiratory distress.      Breath sounds: Normal breath sounds.   Abdominal:      General: There is no distension.      Palpations: Abdomen is soft.      Tenderness: There is no abdominal tenderness. There is no right CVA tenderness, left CVA tenderness, guarding or rebound.   Musculoskeletal:         General: No tenderness. Normal range of motion.      Cervical back: Normal range of motion and neck supple. No rigidity.   Skin:     General: Skin is warm.      Capillary Refill: Capillary refill takes less than 2 seconds.   Neurological:      General: No focal deficit present.      Mental Status: She is alert.   Psychiatric:         Mood and Affect: Mood normal.         Behavior: Behavior normal.         Thought Content: Thought content normal.         Judgment: Judgment normal.       Emergency Department Course     Laboratory:  Labs Ordered and Resulted from Time of ED Arrival to Time of ED Departure   ROUTINE UA WITH MICROSCOPIC REFLEX TO " CULTURE - Abnormal       Result Value    Color Urine Light Yellow      Appearance Urine Slightly Cloudy (*)     Glucose Urine Negative      Bilirubin Urine Negative      Ketones Urine Negative      Specific Gravity Urine 1.017      Blood Urine Negative      pH Urine 7.0      Protein Albumin Urine 20  (*)     Urobilinogen Urine Normal      Nitrite Urine Negative      Leukocyte Esterase Urine Large (*)     Mucus Urine Present (*)     RBC Urine 7 (*)     WBC Urine 118 (*)     Squamous Epithelials Urine 2 (*)    INFLUENZA A/B & SARS-COV2 PCR MULTIPLEX - Abnormal    Influenza A PCR Negative      Influenza B PCR Negative      SARS CoV2 PCR Positive (*)    HCG QUALITATIVE URINE - Normal    hCG Urine Qualitative Negative     URINE CULTURE      Emergency Department Course:  Reviewed:  I reviewed nursing notes, vitals and past medical history    Assessments:  5:02 PM I obtained history and examined the patient as noted above.   5:38 PM  Pt now refusing CT imaging.  Stating she needs to go home.  We discussed risks (infected stone, etc, and if present this could be potentially life threatening.  That said, my clinical susp is very low so I have elected to not sign her out AMA).  Labs and CT canceled.     Disposition:  The patient was discharged to home.     Impression & Plan      Covid-19  Carlitos Melendez was evaluated during a global COVID-19 pandemic, which necessitated consideration that the patient might be at risk for infection with the SARS-CoV-2 virus that causes COVID-19.   Applicable protocols for evaluation were followed during the patient's care.   COVID-19 was considered as part of the patient's evaluation. The plan for testing is:  a test was obtained during this visit and positive     Medical Decision Makin y/o female presents for evaluation of urinary irritative complaints (primarily urgency) for the past few days, concerning her for UTI.  She also notes back pain although this is a chronic issue however  has felt a bit worse for the past few days.  She has a Hx of ureteral stones that requires surgery in 2009, none since.      She also notes nasal congestion and sons were sent home from school today due to symptoms of loss of taste and smell.  Sons are being tested tomorrow.     On exam, she is well appearing.  Vitals noted and reassuring.  Discussed COVID positive.  She is felt stable from this standpoint as no hypoxia of dyspnea, etc.  In regards to UTI, discussed not suspected this is pyelo.  Discussed felt less likely this is ureteral colic in regards to her back pain however pt concerned and with shared decision making in regards to imaging she would like to proceed. CT A/P stone protocol to eval for consideration of infected stone.  Will check CBC for leukocytosis/penia, anemia, and abnl platelets.  BMP to assess electrolytes and renal function.  She is comfortable with plan.     Update: Pt refusing imaging now due to needed to go home.  Does not want to wait.  We discussed risks (infected stone, potentially life threatening, etc), she acknowledges risks.  I have low to no susp this is stone picture and elected to not sign her out AMA.  Pt educated on S/S that should prompt ED re-eval (See D/C sheet) both in regards to COVID and UTI.  Questions answered. Verbalized understanding. Comfortable with plan and appreciative.     Diagnosis:    ICD-10-CM    1. Urinary tract infection with hematuria, site unspecified  N39.0     R31.9    2. Infection due to 2019 novel coronavirus  U07.1         Discharge Medications:  New Prescriptions    NITROFURANTOIN MACROCRYSTAL-MONOHYDRATE (MACROBID) 100 MG CAPSULE    Take 1 capsule (100 mg) by mouth 2 times daily      12/2/2021   Mallika Quiroz PA-C

## 2021-12-02 NOTE — ED TRIAGE NOTES
Patient complaints urinary problems.  Urinates, then still feels like she has to urinate.  Back spasms for awhile, come & go but now more frequently.   States has cough & kids sent home from school with Covid symptoms.

## 2021-12-02 NOTE — DISCHARGE INSTRUCTIONS
Discharge Instructions  Urinary Tract Infection  You or your child have been diagnosed with a urinary tract infection, or UTI. The urinary tract includes the kidneys (which make urine/pee), ureters (the tubes that carry urine/pee from the kidneys to the bladder), the bladder (which stores urine/pee), and urethra (the tube that carries urine/pee out of the bladder). Urinary tract infections occur when bacteria travel up the urethra into the bladder (bladder infection) and, in some cases, from there into the kidneys (kidney infection).  Generally, every Emergency Department visit should have a follow-up clinic visit with either a primary or a specialty clinic/provider. Please follow-up as instructed by your emergency provider today.  Return to the Emergency Department if:  You or your child have severe back pain.  You or your child are vomiting (throwing up) so that you cannot take your medicine.  You or your child have a new fever (had not previously had a fever) over 101 F.  You or your child have confusion or are very weak, or feel very ill.  Your child seems much more ill, will not wake up, will not respond right, or is crying for a long time and will not calm down.  You or your child are showing signs of dehydration. These signs may include decreased urination (pee), dry mouth/gums/tongue, or decreased activity.    Follow-up with your provider:   Children under 24 months need to be seen by their regular provider within one week after a diagnosis of a UTI. It may be necessary to do some more tests to look at the child s kidney or bladder.  You should begin to feel better within 24 - 48 hours of starting your antibiotic; follow-up with your regular clinic/doctor/provider if this is not the case.    Treatment:   You will be treated with an antibiotic to kill the bacteria. We have to make an educated guess, based on what we know about common bacteria and antibiotics, as to which antibiotic will work for your  "infection. We will be correct most times but there will be some cases where the antibiotic chosen is not correct (see urine cultures below).  Take a pain medication such as acetaminophen (Tylenol ) or ibuprofen (Advil , Motrin , Nuprin ).  Phenazopyridine (Pyridium , Uristat ) is a prescription medication that numbs the bladder to reduce the burning pain of some UTIs.  The same medication is available in a non-prescription version (Azo-Standard , Urodol ). This medication will change the color of the urine and tears (usually blue or orange). If you wear contacts, do not wear them while taking this medication as they may be stained by the medication.    Urine Cultures:  If indicated, a urine culture may have been performed today. This test generally takes 24-48 hours to complete so the results are not known at this time. The results can confirm that an infection is present but also determine which antibiotic is effective for the specific bacteria that is causing the infection. If your urine culture shows that the antibiotic you were given today will not work to treat your infection, we will attempt to contact you to make arrangements to change the antibiotic. If the culture confirms that the antibiotic is effective for your infection, you will not be contacted. We often recommend follow-up with your regular physician/provider on the culture results regardless of this process.    Antibiotic Warning:   If you have been placed on antibiotics - watch for signs of allergic reaction.  These include rash, lip swelling, difficulty breathing, wheezing, and dizziness.  If you develop any of these symptoms, stop the antibiotic immediately and go to an emergency room or urgent care for evaluation.    Probiotics: If you have been given an antibiotic, you may want to also take a probiotic pill or eat yogurt with live cultures. Probiotics have \"good bacteria\" to help your intestines stay healthy. Studies have shown that probiotics " help prevent diarrhea and other intestine problems (including C. diff infection) when you take antibiotics. You can buy these without a prescription in the pharmacy section of the store.   If you were given a prescription for medicine here today, be sure to read all of the information (including the package insert) that comes with your prescription.  This will include important information about the medicine, its side effects, and any warnings that you need to know about.  The pharmacist who fills the prescription can provide more information and answer questions you may have about the medicine.  If you have questions or concerns that the pharmacist cannot address, please call or return to the Emergency Department.   Remember that you can always come back to the Emergency Department if you are not able to see your regular provider in the amount of time listed above, if you get any new symptoms, or if there is anything that worries you.    The examination and treatment you have received in the Emergency Department has been rendered on an emergency basis only and not intended to be a substitute for complete ongoing medical care.

## 2021-12-02 NOTE — LETTER
December 2, 2021      To Whom It May Concern:      Carlitos Melendez was seen in our Emergency Department today, 12/02/21. She may return to work/school when improved.    Sincerely,        Dedrick LOPEZ RN

## 2021-12-02 NOTE — ED NOTES
Pt turned all light on and stated she did not want to wait for the CT. Provider at bedside discussing discharge.

## 2021-12-03 ENCOUNTER — APPOINTMENT (OUTPATIENT)
Dept: CT IMAGING | Facility: CLINIC | Age: 36
End: 2021-12-03
Attending: EMERGENCY MEDICINE
Payer: COMMERCIAL

## 2021-12-03 ENCOUNTER — HOSPITAL ENCOUNTER (EMERGENCY)
Facility: CLINIC | Age: 36
Discharge: HOME | End: 2021-12-03
Attending: EMERGENCY MEDICINE | Admitting: EMERGENCY MEDICINE
Payer: COMMERCIAL

## 2021-12-03 VITALS
TEMPERATURE: 97.8 F | SYSTOLIC BLOOD PRESSURE: 115 MMHG | OXYGEN SATURATION: 99 % | HEART RATE: 88 BPM | BODY MASS INDEX: 29.82 KG/M2 | HEIGHT: 65 IN | RESPIRATION RATE: 18 BRPM | WEIGHT: 179 LBS | DIASTOLIC BLOOD PRESSURE: 56 MMHG

## 2021-12-03 DIAGNOSIS — N39.0 URINARY TRACT INFECTION WITHOUT HEMATURIA, SITE UNSPECIFIED: ICD-10-CM

## 2021-12-03 DIAGNOSIS — U07.1 INFECTION DUE TO 2019 NOVEL CORONAVIRUS: ICD-10-CM

## 2021-12-03 DIAGNOSIS — N83.209 CYST OF OVARY, UNSPECIFIED LATERALITY: ICD-10-CM

## 2021-12-03 PROCEDURE — 74176 CT ABD & PELVIS W/O CONTRAST: CPT

## 2021-12-03 PROCEDURE — 99284 EMERGENCY DEPT VISIT MOD MDM: CPT | Mod: 25

## 2021-12-03 ASSESSMENT — MIFFLIN-ST. JEOR: SCORE: 1512.82

## 2021-12-03 ASSESSMENT — ENCOUNTER SYMPTOMS
BACK PAIN: 1
FEVER: 0

## 2021-12-03 NOTE — ED TRIAGE NOTES
Patient stated that she was here yesterday and was told she has a UTI, possible kidney stones, and should have a CT, but she left without having any imaging completed. She returns today to have a CT scan which she says the MD advised to ensure that she does not have kidney stones.

## 2021-12-03 NOTE — ED PROVIDER NOTES
"  History   Chief Complaint:  UTI       The history is provided by the patient.      Carlitos Melendez is a 36 year old female with history of kidney stones who presents with UTI. The patient reports that she presented to the ED yesterday for back pain and diagnosed with a UTI, for which she was recommended to undergo a CT. She states that she tested positive for Covid-19 at that time and returned home AMA, without further evaluation. She notes that she now presents to the ED to complete her CT as recommended. The patient reports a history of kidney stones occurring in , about 11 years ago, for which she required stent placement. She notes intermittent urinary symptoms since then, including urgency and occasional incontinence. At this time, she reports no fevers.    Review of Systems   Constitutional: Negative for fever.   Musculoskeletal: Positive for back pain.   10 systems reviewed and negative except as above and in HPI.    Allergies:  Penicillins    Medications:  Macrobid    Past Medical History:     Asthma  COVID-19  Heartburn  Kidney stones  Neuralgic migraines  Pyelonephritis    Past Surgical History:    Cholecystectomy    Other abdominal surgery     Family History:    The patient denies past family history.    Social History:  Presents to the emergency department alone  Arrives via car  History of tobacco abuse  History of cannabis abuse    Physical Exam     Patient Vitals for the past 24 hrs:   BP Temp Temp src Pulse Resp SpO2 Height Weight   21 0705 115/56 97.8  F (36.6  C) Temporal 88 18 99 % 1.651 m (5' 5\") 81.2 kg (179 lb)       Physical Exam  General: Resting on the gurney, appears uncomfortable  Head:  The scalp, face, and head appear normal  Mouth/Throat: Mucus membranes are moist  CV:  Regular rate    Normal S1 and S2  No pathological murmur   Resp:  Breath sounds clear and equal bilaterally    Non-labored, no retractions or accessory muscle use    No coarseness    No wheezing "     No respiratory distress.  GI:  Abdomen is soft, no rigidity    No tenderness to palpation  MS:  Mild suprapubic tenderness to palpation. Diffuse low back tenderness to palpation. No CVA tenderness to percussion.   Skin:   No rash or lesions noted.  Neuro:  Speech is normal and fluent. No apparent deficit.  Psych:  Awake. Alert.  Normal affect.      Appropriate interactions.      Emergency Department Course     Imaging:  Abd/pelvis CT no contrast - Stone Protocol   Final Result   IMPRESSION:    1.  No acute findings in the abdomen and pelvis.   2.  Midline posterior pelvic cyst measuring 4.8 cm, likely arising   from the right ovary. Further assessment with pelvic ultrasound in   about 4 weeks can be considered.   3.  Left renal 2 mm nonobstructing calculus.      ROSINA WATKINS MD            SYSTEM ID:  [id number]        Report per radiology    Emergency Department Course:  Reviewed:  I reviewed nursing notes, vitals, past medical history and Care Everywhere    Assessments:  0711 I obtained history and examined the patient as noted above.   0823 I rechecked the patient and explained findings.    Disposition:  The patient was discharged to home.     Impression & Plan     CMS Diagnoses: None    Medical Decision Making:  Carlitos Melendez is a 36 year old female has symptoms of back pain.    She was seen yesterday and came back today for a CT.  Ct obtained nad shows no evidence of stone.   There is no clinical evidence of pyelonephritis, appendicitis,  or diverticulitis.  The patient was already started on antibiotics for the infection. The patient is instructed to return if increasing pain, vomiting, fever, or inability to tolerate the oral antibiotic.  Follow up with primary physician is indicated if not improving in 2-3 days.     Diagnosis:    ICD-10-CM    1. Urinary tract infection without hematuria, site unspecified  N39.0    2. Infection due to 2019 novel coronavirus  U07.1    3. Cyst of ovary, unspecified  laterality  N83.209        Scribe Disclosure:  I, Scott Nathalie, am serving as a scribe at 7:10 AM on 12/3/2021 to document services personally performed by Ivonne Rich MD based on my observations and the provider's statements to me.              Ivonne Rich MD  12/04/21 6333

## 2021-12-04 LAB — BACTERIA UR CULT: ABNORMAL

## 2022-01-16 ENCOUNTER — HEALTH MAINTENANCE LETTER (OUTPATIENT)
Age: 37
End: 2022-01-16

## 2022-01-25 ENCOUNTER — HOSPITAL ENCOUNTER (EMERGENCY)
Facility: CLINIC | Age: 37
Discharge: HOME | End: 2022-01-25
Attending: PHYSICIAN ASSISTANT | Admitting: PHYSICIAN ASSISTANT
Payer: COMMERCIAL

## 2022-01-25 VITALS
SYSTOLIC BLOOD PRESSURE: 115 MMHG | HEIGHT: 65 IN | WEIGHT: 179 LBS | BODY MASS INDEX: 29.82 KG/M2 | TEMPERATURE: 99.6 F | RESPIRATION RATE: 16 BRPM | HEART RATE: 72 BPM | DIASTOLIC BLOOD PRESSURE: 85 MMHG | OXYGEN SATURATION: 99 %

## 2022-01-25 DIAGNOSIS — N39.0 UTI (URINARY TRACT INFECTION): ICD-10-CM

## 2022-01-25 DIAGNOSIS — R19.7 DIARRHEA, UNSPECIFIED TYPE: ICD-10-CM

## 2022-01-25 DIAGNOSIS — R11.2 NAUSEA AND VOMITING, INTRACTABILITY OF VOMITING NOT SPECIFIED, UNSPECIFIED VOMITING TYPE: ICD-10-CM

## 2022-01-25 DIAGNOSIS — H61.23 BILATERAL IMPACTED CERUMEN: ICD-10-CM

## 2022-01-25 LAB
ALBUMIN UR-MCNC: 30 MG/DL
AMORPH CRY #/AREA URNS HPF: ABNORMAL /HPF
APPEARANCE UR: ABNORMAL
BACTERIA #/AREA URNS HPF: ABNORMAL /HPF
BILIRUB UR QL STRIP: NEGATIVE
COLOR UR AUTO: ABNORMAL
FLUAV RNA SPEC QL NAA+PROBE: NEGATIVE
FLUBV RNA RESP QL NAA+PROBE: NEGATIVE
GLUCOSE UR STRIP-MCNC: NEGATIVE MG/DL
HCG UR QL: NEGATIVE
HGB UR QL STRIP: ABNORMAL
KETONES UR STRIP-MCNC: ABNORMAL MG/DL
LEUKOCYTE ESTERASE UR QL STRIP: ABNORMAL
MUCOUS THREADS #/AREA URNS LPF: PRESENT /LPF
NITRATE UR QL: NEGATIVE
PH UR STRIP: 6.5 [PH] (ref 5–7)
RBC URINE: 10 /HPF
SARS-COV-2 RNA RESP QL NAA+PROBE: NEGATIVE
SP GR UR STRIP: 1.03 (ref 1–1.03)
SQUAMOUS EPITHELIAL: 28 /HPF
UROBILINOGEN UR STRIP-MCNC: 2 MG/DL
WBC URINE: 86 /HPF

## 2022-01-25 PROCEDURE — 87636 SARSCOV2 & INF A&B AMP PRB: CPT | Performed by: PHYSICIAN ASSISTANT

## 2022-01-25 PROCEDURE — 87086 URINE CULTURE/COLONY COUNT: CPT | Performed by: PHYSICIAN ASSISTANT

## 2022-01-25 PROCEDURE — 99284 EMERGENCY DEPT VISIT MOD MDM: CPT

## 2022-01-25 PROCEDURE — 250N000009 HC RX 250: Performed by: PHYSICIAN ASSISTANT

## 2022-01-25 PROCEDURE — 81025 URINE PREGNANCY TEST: CPT | Performed by: PHYSICIAN ASSISTANT

## 2022-01-25 PROCEDURE — 81001 URINALYSIS AUTO W/SCOPE: CPT | Performed by: PHYSICIAN ASSISTANT

## 2022-01-25 PROCEDURE — C9803 HOPD COVID-19 SPEC COLLECT: HCPCS

## 2022-01-25 PROCEDURE — 250N000011 HC RX IP 250 OP 636: Performed by: PHYSICIAN ASSISTANT

## 2022-01-25 PROCEDURE — 69209 REMOVE IMPACTED EAR WAX UNI: CPT | Mod: 50

## 2022-01-25 RX ORDER — ONDANSETRON 4 MG/1
4 TABLET, ORALLY DISINTEGRATING ORAL EVERY 8 HOURS PRN
Qty: 10 TABLET | Refills: 0 | Status: SHIPPED | OUTPATIENT
Start: 2022-01-25 | End: 2022-05-30

## 2022-01-25 RX ORDER — NITROFURANTOIN 25; 75 MG/1; MG/1
100 CAPSULE ORAL 2 TIMES DAILY
Qty: 10 CAPSULE | Refills: 0 | Status: SHIPPED | OUTPATIENT
Start: 2022-01-25 | End: 2022-01-30

## 2022-01-25 RX ORDER — ACETAMINOPHEN 160 MG
100 TABLET,DISINTEGRATING ORAL ONCE
Status: COMPLETED | OUTPATIENT
Start: 2022-01-25 | End: 2022-01-25

## 2022-01-25 RX ORDER — ONDANSETRON 4 MG/1
4 TABLET, ORALLY DISINTEGRATING ORAL ONCE
Status: COMPLETED | OUTPATIENT
Start: 2022-01-25 | End: 2022-01-25

## 2022-01-25 RX ADMIN — ONDANSETRON 4 MG: 4 TABLET, ORALLY DISINTEGRATING ORAL at 15:30

## 2022-01-25 RX ADMIN — HYDROGEN PEROXIDE 100 ML: 2.65 LIQUID TOPICAL at 16:11

## 2022-01-25 ASSESSMENT — ENCOUNTER SYMPTOMS
DIZZINESS: 0
DYSURIA: 0
DIFFICULTY URINATING: 0
NAUSEA: 1
COUGH: 0
ABDOMINAL PAIN: 1
FREQUENCY: 0
BLOOD IN STOOL: 0
DIARRHEA: 1
SHORTNESS OF BREATH: 0
SORE THROAT: 0
FEVER: 0
LIGHT-HEADEDNESS: 0
VOMITING: 1

## 2022-01-25 ASSESSMENT — MIFFLIN-ST. JEOR: SCORE: 1507.82

## 2022-01-25 NOTE — ED PROVIDER NOTES
History     Chief Complaint:  Abdominal Pain and Ear Fullness     36 y/o female presents for evaluation of N/V/D.  Also notes left ear fullness/pain and decreased hearing over past few days.     1. N/V/D.  [place] [facility] (Today [place] Tuesday[place] That evening experienced N/V/D.  Non-bloody.  Last vomited yesterday.  Stooling 3x/day. No one else sick with similar symptoms.  No known bad food. No recent ABX.  No foreign travel. Intermittent cramping to abd. Denies F/C. No dyspnea or cough. No CP.  No vaginal discharge.  Denies urinary irritative complaints but Hx of UTIs. Had COVID in dec.     2. Ear pain/fullness/decreased hearing.  Denies drainage. Mild intermittent Has.  Denies fevers.  No ST or dental pain.  No cough. No dizziness.     Denies pregnancy  Not breast feeding    Local resident  PCP: has new pt appt schedule for mid Feb      The history is provided by the patient and medical records. No  was used.      ROS:  Review of Systems   Constitutional: Negative for fever.   HENT: Positive for ear pain. Negative for dental problem and sore throat.    Respiratory: Negative for cough and shortness of breath.    Cardiovascular: Negative for chest pain.   Gastrointestinal: Positive for abdominal pain, diarrhea, nausea and vomiting. Negative for blood in stool.   Genitourinary: Negative for difficulty urinating, dysuria, frequency and urgency.   Neurological: Negative for dizziness and light-headedness.   All other systems reviewed and are negative.    Allergies:  Penicillins     Medications:    nitroFURantoin macrocrystal-monohydrate (MACROBID) 100 MG capsule      Past Medical History:    Past Medical History:   Diagnosis Date     Mild intermittent asthma      Patient Active Problem List   Diagnosis     Suspected COVID-19 virus infection      Past Surgical History:    Past Surgical History:   Procedure Laterality Date     CHOLECYSTECTOMY  2008      Family History:    family history is  "not on file.    Social History:   reports that she has been smoking cigarettes. She has been smoking about 1.00 pack per day. She has never used smokeless tobacco. She reports that she does not drink alcohol and does not use drugs.  PCP: Radha, Brandi Limon     Physical Exam     Patient Vitals for the past 24 hrs:   BP Temp Temp src Pulse Resp SpO2 Height Weight   01/25/22 1305 119/79 99.6  F (37.6  C) Temporal 68 16 99 % 1.651 m (5' 5\") 81.2 kg (179 lb)      Physical Exam  Vitals and nursing note reviewed.   Constitutional:       General: She is not in acute distress.     Appearance: Normal appearance. She is not ill-appearing, toxic-appearing or diaphoretic.   HENT:      Head: Normocephalic.      Right Ear: There is impacted cerumen.      Left Ear: There is impacted cerumen.      Mouth/Throat:      Mouth: Mucous membranes are moist.      Pharynx: Oropharynx is clear. No oropharyngeal exudate or posterior oropharyngeal erythema.      Comments: No trismus.  Clear speech.   Eyes:      General:         Right eye: No discharge.         Left eye: No discharge.      Conjunctiva/sclera: Conjunctivae normal.   Cardiovascular:      Rate and Rhythm: Normal rate and regular rhythm.      Pulses: Normal pulses.      Heart sounds: Normal heart sounds.   Pulmonary:      Effort: Pulmonary effort is normal. No respiratory distress.      Breath sounds: Normal breath sounds.   Abdominal:      General: There is no distension.      Palpations: Abdomen is soft.      Tenderness: There is no abdominal tenderness. There is no right CVA tenderness, left CVA tenderness, guarding or rebound.   Musculoskeletal:         General: Normal range of motion.      Cervical back: Normal range of motion and neck supple. No rigidity.   Skin:     General: Skin is warm.      Capillary Refill: Capillary refill takes less than 2 seconds.   Neurological:      General: No focal deficit present.      Mental Status: She is alert.   Psychiatric:         Mood " and Affect: Mood normal.         Behavior: Behavior normal.         Thought Content: Thought content normal.         Judgment: Judgment normal.       Emergency Department Course     Laboratory:  Labs Ordered and Resulted from Time of ED Arrival to Time of ED Departure   ROUTINE UA WITH MICROSCOPIC REFLEX TO CULTURE - Abnormal       Result Value    Color Urine Orange (*)     Appearance Urine Slightly Cloudy (*)     Glucose Urine Negative      Bilirubin Urine Negative      Ketones Urine Trace (*)     Specific Gravity Urine 1.027      Blood Urine Large (*)     pH Urine 6.5      Protein Albumin Urine 30  (*)     Urobilinogen Urine 2.0      Nitrite Urine Negative      Leukocyte Esterase Urine Large (*)     Bacteria Urine Few (*)     Mucus Urine Present (*)     Amorphous Crystals Urine Few (*)     RBC Urine 10 (*)     WBC Urine 86 (*)     Squamous Epithelials Urine 28 (*)    HCG QUALITATIVE URINE - Normal    hCG Urine Qualitative Negative     INFLUENZA A/B & SARS-COV2 PCR MULTIPLEX - Normal    Influenza A PCR Negative      Influenza B PCR Negative      SARS CoV2 PCR Negative     URINE CULTURE      Emergency Department Course:       Reviewed:  I reviewed nursing notes, vitals and past medical history    Assessments:  1439: I obtained history and examined the patient as noted above.   1628: Ears still with impactions.  Pt very anxious for discharge. Call to lab, running HCG now.     Interventions:  Medications   ondansetron (ZOFRAN-ODT) ODT tab 4 mg (4 mg Oral Given 22 1530)   hydrogen peroxide 3 % solution 100 mL (100 mLs Topical Given 22 1611)      Disposition:  The patient was discharged to home.     Impression & Plan      Medical Decision Makin y/o female presents for evaluation of N/V/D.  Also notes left ear fullness/pain and decreased hearing over past few days.     1. N/V/D.  [place] [facility] [facility] [place] (Today [place] Tuesday). That evening experienced N/V/D.  Non-bloody.  Last vomited yesterday.   Stooling 3x/day. No one else sick with similar symptoms.  No known bad food. No recent ABX.  No foreign travel. Intermittent cramping to abd. Denies F/C. No dyspnea or cough. No CP.  No vaginal discharge.  Denies urinary irritative complaints but Hx of UTIs. Had COVID in dec.     2. Ear pain/fullness/decreased hearing.  Denies drainage. Mild intermittent Has.  Denies fevers.  No ST or dental pain.  No cough. No dizziness.     On exam, well appearing, vitals noted.   1. Non-bloody, no fevers, non-tender ABD. No worrisome risk factors, tolerating PO today.  Discussed suspect this is viral or food borne AGE, not felt stool studies indicated at this time but if symptoms persist could consider. Not suspected this is diverticulitis or acute appy, etc.  Vitals reassuring, not suspected this is worrisome dehydration and she is tolerating PO.  COVID checked as considered, neg.  UA checked and susp for UTI, will treat with macrobid (last Cx sensitive).  UPT delayed in lab but resulted prior to discharge and neg.  Discussed she is felt stable for discontinue and recommend F/u with PCP within a weeks time.     2. Cerumen impactions bilat.  Ears irrigated with some wax removed but impactions remain and I am unable to see TMS.  Pt currently very anxious for discharge (has to  son from school).  No fevers, felt less likely worrisome OM, etc.  She is in agreement to hold on ABX, will return if worsening and will trial OTC ear wax removals.      Pt educated on S/S that should prompt ED re-eval.  Questions answered. Verbalized understanding. Comfortable with plan and appreciative.     Diagnosis:    ICD-10-CM    1. Bilateral impacted cerumen  H61.23    2. Nausea and vomiting, intractability of vomiting not specified, unspecified vomiting type  R11.2    3. Diarrhea, unspecified type  R19.7    4. UTI (urinary tract infection)  N39.0       Discharge Medications:    Discharge Medication List as of 1/25/2022  4:41 PM      START  taking these medications    Details   ondansetron (ZOFRAN ODT) 4 MG ODT tab Take 1 tablet (4 mg) by mouth every 8 hours as needed for nausea, Disp-10 tablet, R-0, E-Prescribe          Macrobid also Rx 100mg PO BID x 5 days (ERX to her preferred pharmacy)    1/25/2022   Mallika Quiroz PA-C [employer], Leah M, PA-C  01/25/22 4141

## 2022-01-25 NOTE — ED TRIAGE NOTES
Pt states that on Saturday night she had NVD. Nausea and vomiting have improved. Still having some abdominal pain and diarrhea. Pt states that she had a recent UTI. Pt reports that since that night she has had pain and hearing difficulties out of her left ear. Pt A&Ox4

## 2022-01-25 NOTE — ED NOTES
Patient attempted to do a urine sample and the cup fell in the toilet. Patient will need another label printed when she can provide another sample.

## 2022-01-26 ENCOUNTER — PATIENT OUTREACH (OUTPATIENT)
Dept: CARE COORDINATION | Facility: CLINIC | Age: 37
End: 2022-01-26
Payer: COMMERCIAL

## 2022-01-26 DIAGNOSIS — Z71.89 OTHER SPECIFIED COUNSELING: ICD-10-CM

## 2022-01-26 NOTE — PROGRESS NOTES
Clinic Care Coordination Contact  [facility]/Voicemail       Clinical Data: Care Coordinator Outreach  Outreach attempted x 1.  Left message on patient's voicemail with call back information and requested return call.   Care Coordinator will try to reach patient again in 1-2 business days.      Skye Gordon  [phone]  Care

## 2022-01-27 LAB — BACTERIA UR CULT: ABNORMAL

## 2022-01-27 NOTE — PROGRESS NOTES
Clinic Care Coordination Contact  [facility]/Voicemail       Clinical Data: Care Coordinator Outreach  Outreach attempted x 2.  Left message on patient's voicemail with call back information and requested return call.  Plan: Care Coordinator will do no further outreaches at this time.    JORGE LUIS Clarke  [phone]  [facility]

## 2022-04-08 ENCOUNTER — HOSPITAL ENCOUNTER (EMERGENCY)
Facility: CLINIC | Age: 37
Discharge: HOME | End: 2022-04-08
Attending: EMERGENCY MEDICINE | Admitting: EMERGENCY MEDICINE
Payer: COMMERCIAL

## 2022-04-08 VITALS
SYSTOLIC BLOOD PRESSURE: 105 MMHG | HEART RATE: 94 BPM | DIASTOLIC BLOOD PRESSURE: 67 MMHG | OXYGEN SATURATION: 100 % | TEMPERATURE: 99.7 F | WEIGHT: 170 LBS | HEIGHT: 65 IN | BODY MASS INDEX: 28.32 KG/M2 | RESPIRATION RATE: 20 BRPM

## 2022-04-08 DIAGNOSIS — O21.0 HYPEREMESIS GRAVIDARUM: ICD-10-CM

## 2022-04-08 LAB
ALBUMIN SERPL-MCNC: 3.6 G/DL (ref 3.4–5)
ALP SERPL-CCNC: 69 U/L (ref 40–150)
ALT SERPL W P-5'-P-CCNC: 9 U/L (ref 0–50)
ANION GAP SERPL CALCULATED.3IONS-SCNC: 4 MMOL/L (ref 3–14)
AST SERPL W P-5'-P-CCNC: 7 U/L (ref 0–45)
BASOPHILS # BLD AUTO: 0 10E3/UL (ref 0–0.2)
BASOPHILS NFR BLD AUTO: 0 %
BILIRUB SERPL-MCNC: 0.3 MG/DL (ref 0.2–1.3)
BUN SERPL-MCNC: 7 MG/DL (ref 7–30)
CALCIUM SERPL-MCNC: 9.1 MG/DL (ref 8.5–10.1)
CHLORIDE BLD-SCNC: 108 MMOL/L (ref 94–109)
CO2 SERPL-SCNC: 23 MMOL/L (ref 20–32)
CREAT SERPL-MCNC: 0.5 MG/DL (ref 0.52–1.04)
EOSINOPHIL # BLD AUTO: 0.1 10E3/UL (ref 0–0.7)
EOSINOPHIL NFR BLD AUTO: 1 %
ERYTHROCYTE [DISTWIDTH] IN BLOOD BY AUTOMATED COUNT: 14.3 % (ref 10–15)
GFR SERPL CREATININE-BSD FRML MDRD: >90 ML/MIN/1.73M2
GLUCOSE BLD-MCNC: 109 MG/DL (ref 70–99)
HCT VFR BLD AUTO: 41.3 % (ref 35–47)
HGB BLD-MCNC: 13.8 G/DL (ref 11.7–15.7)
HOLD SPECIMEN: NORMAL
HOLD SPECIMEN: NORMAL
IMM GRANULOCYTES # BLD: 0 10E3/UL
IMM GRANULOCYTES NFR BLD: 0 %
LYMPHOCYTES # BLD AUTO: 2.6 10E3/UL (ref 0.8–5.3)
LYMPHOCYTES NFR BLD AUTO: 26 %
MCH RBC QN AUTO: 30.6 PG (ref 26.5–33)
MCHC RBC AUTO-ENTMCNC: 33.4 G/DL (ref 31.5–36.5)
MCV RBC AUTO: 92 FL (ref 78–100)
MONOCYTES # BLD AUTO: 0.5 10E3/UL (ref 0–1.3)
MONOCYTES NFR BLD AUTO: 5 %
NEUTROPHILS # BLD AUTO: 6.7 10E3/UL (ref 1.6–8.3)
NEUTROPHILS NFR BLD AUTO: 68 %
NRBC # BLD AUTO: 0 10E3/UL
NRBC BLD AUTO-RTO: 0 /100
PLAT MORPH BLD: NORMAL
PLATELET # BLD AUTO: 181 10E3/UL (ref 150–450)
POTASSIUM BLD-SCNC: 3.7 MMOL/L (ref 3.4–5.3)
PROT SERPL-MCNC: 6.9 G/DL (ref 6.8–8.8)
RBC # BLD AUTO: 4.51 10E6/UL (ref 3.8–5.2)
RBC MORPH BLD: NORMAL
SODIUM SERPL-SCNC: 135 MMOL/L (ref 133–144)
WBC # BLD AUTO: 9.9 10E3/UL (ref 4–11)

## 2022-04-08 PROCEDURE — 99283 EMERGENCY DEPT VISIT LOW MDM: CPT

## 2022-04-08 PROCEDURE — 36415 COLL VENOUS BLD VENIPUNCTURE: CPT | Performed by: EMERGENCY MEDICINE

## 2022-04-08 PROCEDURE — 85025 COMPLETE CBC W/AUTO DIFF WBC: CPT | Performed by: EMERGENCY MEDICINE

## 2022-04-08 PROCEDURE — 80053 COMPREHEN METABOLIC PANEL: CPT | Performed by: EMERGENCY MEDICINE

## 2022-04-08 RX ORDER — PROCHLORPERAZINE MALEATE 10 MG
10 TABLET ORAL EVERY 6 HOURS PRN
Qty: 15 TABLET | Refills: 0 | Status: SHIPPED | OUTPATIENT
Start: 2022-04-08 | End: 2022-05-30

## 2022-04-08 RX ORDER — LIDOCAINE 40 MG/G
CREAM TOPICAL
Status: DISCONTINUED | OUTPATIENT
Start: 2022-04-08 | End: 2022-04-08 | Stop reason: HOSPADM

## 2022-04-08 RX ORDER — METOCLOPRAMIDE 10 MG/1
10 TABLET ORAL 3 TIMES DAILY PRN
Qty: 15 TABLET | Refills: 0 | Status: SHIPPED | OUTPATIENT
Start: 2022-04-08 | End: 2022-05-30

## 2022-04-08 NOTE — ED TRIAGE NOTES
Pt at [place] a weeks ago for Nausea. Meds they gave has now made her constipated, continues N/V, and today fell in the shower.

## 2022-04-08 NOTE — LETTER
April 8, 2022      To Whom It May Concern:      Carlitos Melendez was seen in our Emergency Department today, 04/08/22.  I expect her condition to improve over the next  day.  She may return to work/school when improved.    Sincerely,        Niya Gutierrez RN

## 2022-04-09 NOTE — ED PROVIDER NOTES
Visit Date: 04/08/2022    CHIEF COMPLAINT:  Nausea and vomiting.    HISTORY OF PRESENT ILLNESS:  This is a 37-year-old woman who presents to the Emergency Department, stating she has continued nausea and vomiting.  She was just seen in [facility] for similar symptoms about 8 or 9 days ago.  She was 6.2 weeks pregnant at that time, she was worked up.  She had urine.  She had an ultrasound showing a 6-week 2-day intrauterine pregnancy.  She had a quantitative hCG.  She had electrolytes.  She was discharged with Zofran and prenatal vitamins with iron.  She states the prenatal vitamins have caused her to be constipated and the Zofran does not seem to be helping much.  She did have an accidental fall in the shower today and was worried about that, but she did not hurt her abdomen, nor has she had any vaginal bleeding or drainage.  She just wanted to be checked out.    PAST MEDICAL HISTORY:  The patient has had six other pregnancies.  This is her seventh.  She goes back and forth between [place] and the [place].    ALLERGIES:  She does have a PENICILLIN allergy.    FAMILY AND SOCIAL HISTORY:  She does smoke.  She does not drink or use drugs.    REVIEW OF SYSTEMS:  As noted.  All other systems negative.      PHYSICAL EXAMINATION:    GENERAL:  Reveals a black female, supine, no respiratory distress.  VITAL SIGNS:  Blood pressure 105/67, temperature 99.7, pulse 94, respirations 20, pulse ox 100% on room air.  HEENT:  Pupils equal, reactive.  Extraocular movements intact.  Nares and oropharynx clear. No temporal artery tenderness  NECK:  Neck veins flat.  Neck supple without mennigismus  LUNGS:  Clear.  HEART:  Regular, no murmur, rubs, or gallops.  ABDOMEN:  Soft, no tenderness or masses.  MUSCULOSKELETAL:  No swelling or tenderness.  SKIN:  No rash.  NEUROLOGIC:  Awake, alert, appropriate.  No facial asymmetry.  Fluent speech.  Normal motor sensation and coordination.  PSYCHIATRIC:  Normal affect.    EMERGENCY  DEPARTMENT COURSE:  The patient had a bottle of fluids with her, which she had been drinking and has no problems keeping this down.  I did state that at this point a fall in the shower with about eight weeks in pregnancy, there is nothing that could be done or would be done and unless she has pain or bleeding or spotting, we did not need to do another ultrasound, since one was just done.  I have offered her something else for nausea and vomiting both Reglan and Compazine tablets.  I have warned her about dystonic reaction of the Compazine and I will give her prescriptions and she can try this as needed.  She should make a followup with primary OB/GYN.  I did refer her to our call group.    IMPRESSION:  Hyperemesis gravidarum.    PLAN:  As noted.    Wojciech Baxter MD        D: 2022   T: 2022   MT: [id number]    Name:     MIRTHA PAGAN  MRN:      [id number]        Account:    [id number]   :      1985           Visit Date: 2022     Document: [id number]

## 2022-05-30 ENCOUNTER — APPOINTMENT (OUTPATIENT)
Dept: ULTRASOUND IMAGING | Facility: CLINIC | Age: 37
End: 2022-05-30
Attending: EMERGENCY MEDICINE
Payer: COMMERCIAL

## 2022-05-30 ENCOUNTER — HOSPITAL ENCOUNTER (EMERGENCY)
Facility: CLINIC | Age: 37
Discharge: HOME | End: 2022-05-30
Attending: EMERGENCY MEDICINE | Admitting: STUDENT IN AN ORGANIZED HEALTH CARE EDUCATION/TRAINING PROGRAM
Payer: COMMERCIAL

## 2022-05-30 ENCOUNTER — APPOINTMENT (OUTPATIENT)
Dept: MRI IMAGING | Facility: CLINIC | Age: 37
End: 2022-05-30
Attending: EMERGENCY MEDICINE
Payer: COMMERCIAL

## 2022-05-30 ENCOUNTER — APPOINTMENT (OUTPATIENT)
Dept: GENERAL RADIOLOGY | Facility: CLINIC | Age: 37
End: 2022-05-30
Attending: EMERGENCY MEDICINE
Payer: COMMERCIAL

## 2022-05-30 VITALS
RESPIRATION RATE: 20 BRPM | OXYGEN SATURATION: 97 % | TEMPERATURE: 98.6 F | SYSTOLIC BLOOD PRESSURE: 104 MMHG | DIASTOLIC BLOOD PRESSURE: 70 MMHG | HEART RATE: 72 BPM

## 2022-05-30 DIAGNOSIS — Z34.92 SECOND TRIMESTER PREGNANCY: ICD-10-CM

## 2022-05-30 DIAGNOSIS — K29.00 ACUTE GASTRITIS WITHOUT HEMORRHAGE, UNSPECIFIED GASTRITIS TYPE: ICD-10-CM

## 2022-05-30 PROBLEM — K80.50 CHOLEDOCHOLITHIASIS: Status: ACTIVE | Noted: 2022-05-30

## 2022-05-30 LAB
ALBUMIN SERPL-MCNC: 2.7 G/DL (ref 3.4–5)
ALBUMIN UR-MCNC: 30 MG/DL
ALP SERPL-CCNC: 81 U/L (ref 40–150)
ALT SERPL W P-5'-P-CCNC: 10 U/L (ref 0–50)
ANION GAP SERPL CALCULATED.3IONS-SCNC: 8 MMOL/L (ref 3–14)
APPEARANCE UR: CLEAR
AST SERPL W P-5'-P-CCNC: 9 U/L (ref 0–45)
ATRIAL RATE - MUSE: 92 BPM
BASOPHILS # BLD AUTO: 0 10E3/UL (ref 0–0.2)
BASOPHILS NFR BLD AUTO: 0 %
BILIRUB SERPL-MCNC: 0.4 MG/DL (ref 0.2–1.3)
BILIRUB UR QL STRIP: NEGATIVE
BUN SERPL-MCNC: 6 MG/DL (ref 7–30)
CALCIUM SERPL-MCNC: 8.5 MG/DL (ref 8.5–10.1)
CHLORIDE BLD-SCNC: 106 MMOL/L (ref 94–109)
CO2 SERPL-SCNC: 22 MMOL/L (ref 20–32)
COLOR UR AUTO: ABNORMAL
CREAT SERPL-MCNC: 0.43 MG/DL (ref 0.52–1.04)
DIASTOLIC BLOOD PRESSURE - MUSE: NORMAL MMHG
EOSINOPHIL # BLD AUTO: 0.1 10E3/UL (ref 0–0.7)
EOSINOPHIL NFR BLD AUTO: 1 %
ERYTHROCYTE [DISTWIDTH] IN BLOOD BY AUTOMATED COUNT: 14.1 % (ref 10–15)
GFR SERPL CREATININE-BSD FRML MDRD: >90 ML/MIN/1.73M2
GLUCOSE BLD-MCNC: 89 MG/DL (ref 70–99)
GLUCOSE UR STRIP-MCNC: NEGATIVE MG/DL
HCT VFR BLD AUTO: 35.6 % (ref 35–47)
HGB BLD-MCNC: 12.4 G/DL (ref 11.7–15.7)
HGB UR QL STRIP: ABNORMAL
IMM GRANULOCYTES # BLD: 0.1 10E3/UL
IMM GRANULOCYTES NFR BLD: 0 %
INTERPRETATION ECG - MUSE: NORMAL
KETONES UR STRIP-MCNC: ABNORMAL MG/DL
LEUKOCYTE ESTERASE UR QL STRIP: ABNORMAL
LIPASE SERPL-CCNC: 92 U/L (ref 73–393)
LYMPHOCYTES # BLD AUTO: 1.7 10E3/UL (ref 0.8–5.3)
LYMPHOCYTES NFR BLD AUTO: 15 %
MCH RBC QN AUTO: 31.5 PG (ref 26.5–33)
MCHC RBC AUTO-ENTMCNC: 34.8 G/DL (ref 31.5–36.5)
MCV RBC AUTO: 90 FL (ref 78–100)
MONOCYTES # BLD AUTO: 0.5 10E3/UL (ref 0–1.3)
MONOCYTES NFR BLD AUTO: 4 %
MUCOUS THREADS #/AREA URNS LPF: PRESENT /LPF
NEUTROPHILS # BLD AUTO: 9.2 10E3/UL (ref 1.6–8.3)
NEUTROPHILS NFR BLD AUTO: 80 %
NITRATE UR QL: NEGATIVE
NRBC # BLD AUTO: 0 10E3/UL
NRBC BLD AUTO-RTO: 0 /100
P AXIS - MUSE: 36 DEGREES
PH UR STRIP: 7 [PH] (ref 5–7)
PLATELET # BLD AUTO: 175 10E3/UL (ref 150–450)
POTASSIUM BLD-SCNC: 3.5 MMOL/L (ref 3.4–5.3)
PR INTERVAL - MUSE: 154 MS
PROT SERPL-MCNC: 6.4 G/DL (ref 6.8–8.8)
QRS DURATION - MUSE: 82 MS
QT - MUSE: 352 MS
QTC - MUSE: 435 MS
R AXIS - MUSE: -28 DEGREES
RBC # BLD AUTO: 3.94 10E6/UL (ref 3.8–5.2)
RBC URINE: 8 /HPF
SARS-COV-2 RNA RESP QL NAA+PROBE: NEGATIVE
SODIUM SERPL-SCNC: 136 MMOL/L (ref 133–144)
SP GR UR STRIP: 1.02 (ref 1–1.03)
SQUAMOUS EPITHELIAL: 2 /HPF
SYSTOLIC BLOOD PRESSURE - MUSE: NORMAL MMHG
T AXIS - MUSE: 37 DEGREES
TROPONIN I SERPL HS-MCNC: <3 NG/L
UROBILINOGEN UR STRIP-MCNC: NORMAL MG/DL
VENTRICULAR RATE- MUSE: 92 BPM
WBC # BLD AUTO: 11.6 10E3/UL (ref 4–11)
WBC URINE: 10 /HPF

## 2022-05-30 PROCEDURE — 76705 ECHO EXAM OF ABDOMEN: CPT | Mod: XS

## 2022-05-30 PROCEDURE — 93005 ELECTROCARDIOGRAM TRACING: CPT

## 2022-05-30 PROCEDURE — 36415 COLL VENOUS BLD VENIPUNCTURE: CPT | Performed by: EMERGENCY MEDICINE

## 2022-05-30 PROCEDURE — 85025 COMPLETE CBC W/AUTO DIFF WBC: CPT | Performed by: EMERGENCY MEDICINE

## 2022-05-30 PROCEDURE — 76705 ECHO EXAM OF ABDOMEN: CPT

## 2022-05-30 PROCEDURE — 80053 COMPREHEN METABOLIC PANEL: CPT | Performed by: EMERGENCY MEDICINE

## 2022-05-30 PROCEDURE — 250N000013 HC RX MED GY IP 250 OP 250 PS 637: Performed by: EMERGENCY MEDICINE

## 2022-05-30 PROCEDURE — 71046 X-RAY EXAM CHEST 2 VIEWS: CPT

## 2022-05-30 PROCEDURE — 258N000003 HC RX IP 258 OP 636: Performed by: EMERGENCY MEDICINE

## 2022-05-30 PROCEDURE — 96375 TX/PRO/DX INJ NEW DRUG ADDON: CPT

## 2022-05-30 PROCEDURE — 120N000001 HC R&B MED SURG/OB

## 2022-05-30 PROCEDURE — 250N000009 HC RX 250: Performed by: EMERGENCY MEDICINE

## 2022-05-30 PROCEDURE — U0003 INFECTIOUS AGENT DETECTION BY NUCLEIC ACID (DNA OR RNA); SEVERE ACUTE RESPIRATORY SYNDROME CORONAVIRUS 2 (SARS-COV-2) (CORONAVIRUS DISEASE [COVID-19]), AMPLIFIED PROBE TECHNIQUE, MAKING USE OF HIGH THROUGHPUT TECHNOLOGIES AS DESCRIBED BY CMS-2020-01-R: HCPCS | Performed by: EMERGENCY MEDICINE

## 2022-05-30 PROCEDURE — 96361 HYDRATE IV INFUSION ADD-ON: CPT

## 2022-05-30 PROCEDURE — 96374 THER/PROPH/DIAG INJ IV PUSH: CPT

## 2022-05-30 PROCEDURE — 83690 ASSAY OF LIPASE: CPT | Performed by: EMERGENCY MEDICINE

## 2022-05-30 PROCEDURE — 74181 MRI ABDOMEN W/O CONTRAST: CPT

## 2022-05-30 PROCEDURE — C9803 HOPD COVID-19 SPEC COLLECT: HCPCS

## 2022-05-30 PROCEDURE — 87086 URINE CULTURE/COLONY COUNT: CPT | Performed by: EMERGENCY MEDICINE

## 2022-05-30 PROCEDURE — 250N000011 HC RX IP 250 OP 636: Performed by: EMERGENCY MEDICINE

## 2022-05-30 PROCEDURE — 81001 URINALYSIS AUTO W/SCOPE: CPT | Performed by: EMERGENCY MEDICINE

## 2022-05-30 PROCEDURE — 99285 EMERGENCY DEPT VISIT HI MDM: CPT | Mod: 25

## 2022-05-30 PROCEDURE — 84484 ASSAY OF TROPONIN QUANT: CPT | Performed by: EMERGENCY MEDICINE

## 2022-05-30 RX ORDER — MORPHINE SULFATE 4 MG/ML
4 INJECTION, SOLUTION INTRAMUSCULAR; INTRAVENOUS
Status: COMPLETED | OUTPATIENT
Start: 2022-05-30 | End: 2022-05-30

## 2022-05-30 RX ORDER — ONDANSETRON 2 MG/ML
4 INJECTION INTRAMUSCULAR; INTRAVENOUS ONCE
Status: COMPLETED | OUTPATIENT
Start: 2022-05-30 | End: 2022-05-30

## 2022-05-30 RX ORDER — ONDANSETRON 4 MG/1
4 TABLET, ORALLY DISINTEGRATING ORAL EVERY 8 HOURS PRN
Qty: 10 TABLET | Refills: 0 | Status: SHIPPED | OUTPATIENT
Start: 2022-05-30 | End: 2022-06-02

## 2022-05-30 RX ORDER — SODIUM CHLORIDE 9 MG/ML
INJECTION, SOLUTION INTRAVENOUS CONTINUOUS
Status: DISCONTINUED | OUTPATIENT
Start: 2022-05-30 | End: 2022-05-30 | Stop reason: HOSPADM

## 2022-05-30 RX ORDER — FENTANYL CITRATE 50 UG/ML
1 INJECTION, SOLUTION INTRAMUSCULAR; INTRAVENOUS ONCE
Status: COMPLETED | OUTPATIENT
Start: 2022-05-30 | End: 2022-05-30

## 2022-05-30 RX ORDER — PANTOPRAZOLE SODIUM 40 MG/1
40 TABLET, DELAYED RELEASE ORAL DAILY
Qty: 30 TABLET | Refills: 0 | Status: SHIPPED | OUTPATIENT
Start: 2022-05-30 | End: 2022-06-29

## 2022-05-30 RX ADMIN — LIDOCAINE HYDROCHLORIDE 30 ML: 20 SOLUTION ORAL; TOPICAL at 07:09

## 2022-05-30 RX ADMIN — FENTANYL CITRATE 77 MCG: 50 INJECTION, SOLUTION INTRAMUSCULAR; INTRAVENOUS at 11:28

## 2022-05-30 RX ADMIN — ONDANSETRON 4 MG: 2 INJECTION INTRAMUSCULAR; INTRAVENOUS at 07:22

## 2022-05-30 RX ADMIN — MORPHINE SULFATE 4 MG: 4 INJECTION, SOLUTION INTRAMUSCULAR; INTRAVENOUS at 09:15

## 2022-05-30 RX ADMIN — SODIUM CHLORIDE 1000 ML: 9 INJECTION, SOLUTION INTRAVENOUS at 07:00

## 2022-05-30 ASSESSMENT — ACTIVITIES OF DAILY LIVING (ADL): ADLS_ACUITY_SCORE: 35

## 2022-05-30 ASSESSMENT — ENCOUNTER SYMPTOMS
ABDOMINAL PAIN: 1
VOMITING: 1
BACK PAIN: 1
APPETITE CHANGE: 0

## 2022-05-30 NOTE — ED TRIAGE NOTES
[facility]  ED Arrival Note    Arrived to triage c/o chest pain that radiates to the back and nausea. The pain started yesterday, it is sharp, constant but varies with severity. Pt is also 4 months pregnant. Pt states that the p[ain woke her up from sleep this morning prompting this visit.      Visitors during triage: Spouse    Triage Interventions: EKG  Ambulatory: Yes    Directed to: Main ED       Triage Assessment     Row Name 05/30/22 0622       Respiratory WDL    Respiratory WDL WDL       Cardiac WDL    Cardiac WDL X;chest pain       Chest Pain Assessment    Chest Pain Location epigastric       Peripheral/Neurovascular WDL    Peripheral Neurovascular WDL WDL       Cognitive/Neuro/Behavioral WDL    Cognitive/Neuro/Behavioral WDL WDL

## 2022-05-30 NOTE — PHARMACY-ADMISSION MEDICATION HISTORY
Pharmacy Medication History  Admission medication history interview status for the 5/30/2022  admission is complete. See EPIC admission navigator for prior to admission medications     Location of Interview: Patient room  Medication history sources: Patient and Surescripts    Pt denied taking any medication including vitamins/supplements.

## 2022-05-30 NOTE — DISCHARGE INSTRUCTIONS
Return to the ER for fever, worsening pain, persistent vomiting, or any new concerns.    Please follow-up with your obstetrician this week.

## 2022-05-30 NOTE — ED PROVIDER NOTES
History   Chief Complaint:  Chest Pain       The history is provided by the patient.      Carlitos Melendez is a pregnant 37 year old female in her second trimester who presents with chest/ abdominal pain. The patient reports waking up to sharp pain in her chest/ upper abdomin. The patient reports that the pain is sharp and radiates to her right upper back, and has caused her to vomit this morning. She states that in 2009 she had a cholecystectomy and the pain feels somewhat similar but less painful to that experience. She also compares the pain to acid reflux that is persistent. She denies any dietary changes. She reports smoking cigarettes and is attempting to quit.  No alcohol use.    Review of Systems   Constitutional: Negative for appetite change.   Cardiovascular: Positive for chest pain.   Gastrointestinal: Positive for abdominal pain and vomiting.   Musculoskeletal: Positive for back pain.   All other systems reviewed and are negative.        Allergies:  Penicillins    Medications:  The patient is currently on no regular medications.    Past Medical History:     Asthma    Past Surgical History:    Cholecystectomy      Social History:  Smokes cigarettes but is attempting to quit.   Arrived via car.     Physical Exam     Patient Vitals for the past 24 hrs:   BP Temp Temp src Pulse Resp SpO2   05/30/22 1145 104/70 -- -- -- 20 97 %   05/30/22 0930 106/65 -- -- 72 -- 98 %   05/30/22 0915 109/76 -- -- -- -- 99 %   05/30/22 0900 119/61 -- -- -- -- 99 %   05/30/22 0845 -- -- -- -- -- 100 %   05/30/22 0730 -- -- -- 79 15 98 %   05/30/22 0715 -- -- -- 109 29 97 %   05/30/22 0700 108/69 -- -- 80 17 100 %   05/30/22 0627 112/61 98.6  F (37  C) Oral 88 20 98 %       Physical Exam  Constitutional:       General: She is not in acute distress.     Appearance: She is not diaphoretic.   HENT:      Head: Atraumatic.      Mouth/Throat:      Pharynx: No oropharyngeal exudate.   Eyes:      General: No scleral icterus.      Pupils: Pupils are equal, round, and reactive to light.   Cardiovascular:      Rate and Rhythm: Normal rate and regular rhythm.      Heart sounds: Normal heart sounds.   Pulmonary:      Effort: No respiratory distress.      Breath sounds: Normal breath sounds.   Abdominal:      General: Bowel sounds are normal.      Palpations: Abdomen is soft.      Tenderness: There is abdominal tenderness (Epigastric).      Comments: Gravid uterus   Musculoskeletal:         General: No tenderness.   Skin:     General: Skin is warm.      Findings: No rash.           Emergency Department Course   ECG  ECG results from 05/30/22   EKG 12 lead     Value    Systolic Blood Pressure     Diastolic Blood Pressure     Ventricular Rate 92    Atrial Rate 92    GA Interval 154    QRS Duration 82        QTc 435    P Axis 36    R AXIS -28    T Axis 37    Interpretation ECG      Sinus rhythm  Normal ECG  No previous ECGs available  Confirmed by GENERATED REPORT, COMPUTER ([id number]),  AMBER DAVIS ([id number]) on 5/30/2022 6:57:09 AM         Imaging:  MR Abdomen MRCP without Contrast   Final Result   IMPRESSION:   1.  Mild central biliary dilation likely reservoir effect following cholecystectomy. No evidence of choledocholithiasis.      XR Chest 2 Views   Final Result   IMPRESSION: Negative chest.      US Appendix Only (RLQ)   Final Result   IMPRESSION:   1.  The appendix is not visualized which reduces the likelihood of acute appendicitis (a normal appendix is not expected to be visualized).            US Abdomen Limited (RUQ)   Final Result   IMPRESSION:   1.  Cholecystectomy.   2.  A 4 mm echogenic structure within the common duct is highly suggestive of choledocholithiasis. No associated biliary dilatation.              Report per radiology    Laboratory:  Labs Ordered and Resulted from Time of ED Arrival to Time of ED Departure   COMPREHENSIVE METABOLIC PANEL - Abnormal       Result Value    Sodium 136      Potassium 3.5      Chloride  106      Carbon Dioxide (CO2) 22      Anion Gap 8      Urea Nitrogen 6 (*)     Creatinine 0.43 (*)     Calcium 8.5      Glucose 89      Alkaline Phosphatase 81      AST 9      ALT 10      Protein Total 6.4 (*)     Albumin 2.7 (*)     Bilirubin Total 0.4      GFR Estimate >90     ROUTINE UA WITH MICROSCOPIC REFLEX TO CULTURE - Abnormal    Color Urine Light Yellow      Appearance Urine Clear      Glucose Urine Negative      Bilirubin Urine Negative      Ketones Urine Trace (*)     Specific Gravity Urine 1.020      Blood Urine Small (*)     pH Urine 7.0      Protein Albumin Urine 30  (*)     Urobilinogen Urine Normal      Nitrite Urine Negative      Leukocyte Esterase Urine Moderate (*)     Mucus Urine Present (*)     RBC Urine 8 (*)     WBC Urine 10 (*)     Squamous Epithelials Urine 2 (*)    CBC WITH PLATELETS AND DIFFERENTIAL - Abnormal    WBC Count 11.6 (*)     RBC Count 3.94      Hemoglobin 12.4      Hematocrit 35.6      MCV 90      MCH 31.5      MCHC 34.8      RDW 14.1      Platelet Count 175      % Neutrophils 80      % Lymphocytes 15      % Monocytes 4      % Eosinophils 1      % Basophils 0      % Immature Granulocytes 0      NRBCs per 100 WBC 0      Absolute Neutrophils 9.2 (*)     Absolute Lymphocytes 1.7      Absolute Monocytes 0.5      Absolute Eosinophils 0.1      Absolute Basophils 0.0      Absolute Immature Granulocytes 0.1      Absolute NRBCs 0.0     LIPASE - Normal    Lipase 92     TROPONIN I - Normal    Troponin I High Sensitivity <3     COVID-19 VIRUS (CORONAVIRUS) BY PCR - Normal    SARS CoV2 PCR Negative     URINE CULTURE      Bedside fetal heart rate performed by ultrasound by myself: 152    Emergency Department Course:       Reviewed:  I reviewed nursing notes, vitals and past medical history    Assessments:  0632 I obtained history and examined the patient as noted above.   0940 I rechecked the patient and explained findings.     Consults:  1010 I spoke with Dr. Beasley of the ObGyn service  from [facility] regarding patient's presentation, findings, and plan of care.      Interventions:  0700 0.9% sodium chloride BOLUS 1L IV   0709 GI cocktail   0722 Zofran 4 mg IV   0915 Morphine 4 mg IV     Disposition:  The patient was admitted to the hospital under the care of Dr. Sandoval.     Impression & Plan     Medical Decision Making:  This patient presents to the ED for epigastric discomfort.  She has a history of cholecystectomy so choledocholithiasis was considered.  Her white count was just slightly elevated and ultrasound questionably showed a stone in the bile duct.  GI was consulted.  MRCP was performed which actually does not demonstrate any choledocholithiasis.  The patient feels improved after treatment here in the ED.  She was seen by medicine.  She was also seen by Dr. Ramsay of gastroenterology and appreciate his input.  Given the patient is feeling improved and we feel that her pain is most likely related to gastritis she is appropriate for discharge.  The patient is comfortable with this plan.  She will be initiated on Protonix.  She will follow-up in the GI clinic.  She will continue to follow with OB as well.    Diagnosis:    ICD-10-CM    1. Second trimester pregnancy  Z34.92    2. Acute gastritis without hemorrhage, unspecified gastritis type  K29.00        Discharge Medications:  Discharge Medication List as of 5/30/2022 12:11 PM      START taking these medications    Details   ondansetron (ZOFRAN ODT) 4 MG ODT tab Take 1 tablet (4 mg) by mouth every 8 hours as needed for nausea, Disp-10 tablet, R-0, E-Prescribe      pantoprazole (PROTONIX) 40 MG EC tablet Take 1 tablet (40 mg) by mouth daily for 30 doses, Disp-30 tablet, R-0, E-Prescribe             Scribe Disclosure:  Villa MATT, am serving as a scribe at 6:29 AM on 5/30/2022 to document services personally performed by Anselmo Mart MD based on my observations and the provider's statements to me.             Anselmo Mart MD  05/30/22 [id number]       Anselmo Mart MD  05/30/22 [id number]

## 2022-05-30 NOTE — CONSULTS
[facility]  Gastroenterology Consultation         Carlitos Melendez  [address] [facility] [address]  37 year old female    Admission Date/Time: 5/30/2022  Primary Care Provider: No Ref-Primary, Physician  Referring / Attending Physician:  Dr. Ellis  We were asked to see the patient in consultation by Dr. Mart for evaluation of abdominal pain nausea vomiting.      CC: Abdominal pain chest pain    HPI:  Carlitos Melendez is a 37 year old female who presented to the ER with acute onset of abdominal pain in the epigastric area retrosternal area radiating to upper back patient felt her symptoms are similar to her previous gallbladder patient is s/p cholecystectomy patient also with multiple bouts of nausea and vomiting patient is in second trimester pregnancy.  Patient denied any other precipitating event.  During her evaluation patient's blood work was unremarkable ultrasound of the common bile duct showed possible retained stone in the common bile duct her LFTs are normal.  Due to above-mentioned finding patient was advised to be evaluated with MRCP.  MRCP findings are consistent with minimally dilated biliary system otherwise normal common bile duct no evidence of retained stone or sludge.  Patient has history of heartburn dyspepsia and symptoms of acid reflux patient is actively smoking denying history of alcohol use no other significant systemic complaint rest of review of system is negative.    ROS: A comprehensive ten point review of systems was negative aside from those in mentioned in the HPI.      PAST MED HX:  I have reviewed this patient's medical history and updated it with pertinent information if needed.   Past Medical History:   Diagnosis Date     Mild intermittent asthma        MEDICATIONS:   None       ALLERGIES:   Allergies   Allergen Reactions     Penicillins Hives       SOCIAL HISTORY:  Social History     Tobacco Use     Smoking status: Current Every Day  Smoker     Packs/day: 1.00     Types: Cigarettes     Smokeless tobacco: Never Used   Substance Use Topics     Alcohol use: Never     Drug use: Never       FAMILY HISTORY:  Family History   Problem Relation Age of Onset     Crohn's Disease No family hx of      Ulcerative Colitis No family hx of        PHYSICAL EXAM:   General awake alert somewhat uncomfortable in the epigastric area  Vital Signs with Ranges  Temp: 98.6  F (37  C) Temp src: Oral BP: 104/70 Pulse: 72   Resp: 20 SpO2: 97 % O2 Device: None (Room air)    I/O last 3 completed shifts:  In: 1000 [IV Piggyback:1000]  Out: -     Constitutional: healthy, alert and no distress   Cardiovascular: negative, PMI normal. No lifts, heaves, or thrills. RRR. No murmurs, clicks gallops or rub  Respiratory: negative, Percussion normal. Good diaphragmatic excursion. Lungs clear  Head: Normocephalic. No masses, lesions, tenderness or abnormalities  Neck: Neck supple. No adenopathy. Thyroid symmetric, normal size,, Carotids without bruits.  Abdomen: Abdomen soft, non-tender. BS normal. No masses, organomegaly  SKIN: no suspicious lesions or rashes          ADDITIONAL COMMENTS:   I reviewed the patient's new clinical lab test results.   Recent Labs   Lab Test 05/30/22  0653 04/08/22  1610   WBC 11.6* 9.9   HGB 12.4 13.8   MCV 90 92    181     Recent Labs   Lab Test 05/30/22  0653 04/08/22  1618   POTASSIUM 3.5 3.7   CHLORIDE 106 108   CO2 22 23   BUN 6* 7   ANIONGAP 8 4     Recent Labs   Lab Test 05/30/22  0702 05/30/22  0653 04/08/22  1618 01/25/22  1308 12/02/21  1535   ALBUMIN  --  2.7* 3.6  --   --    BILITOTAL  --  0.4 0.3  --   --    ALT  --  10 9  --   --    AST  --  9 7  --   --    PROTEIN 30 *  --   --  30 * 20 *   LIPASE  --  92  --   --   --        I reviewed the patient's new imaging results.        CONSULTATION ASSESSMENT AND PLAN:    Active Problems:    Choledocholithiasis    Assessment: Very pleasant 37-year-old female who is currently second trimester  of her pregnancy presented to ER with significant abdominal pain in the epigastric area along with heartburn dyspepsia patient continues to smoke patient work-up was concerning for possible retained stone in the common bile duct patient is s/p cholecystectomy however patient's LFTs are normal and also her MRCP did not show any evidence of retained stone I suspect patient current findings are due to acid reflux and possible esophageal spasm versus esophagitis and gastritis.  I will recommend to be started on Protonix and Zofran as needed I will also encourage patient to quit smoking continue on supportive care patient can be discharged and follow-up as an outpatient.  Finding and plan discussed in detail with emergency and hospitalist team.  Thank you very much for letting us participate in her care.                Reg Ramsay MD, FACP  [facility].  Office: [phone]  Cell : [phone]      [facility]  Ph: [phone] Fax: [phone]

## 2022-05-30 NOTE — ED NOTES
[facility]  ED Nurse Handoff Report    ED Chief complaint: Chest Pain      ED Diagnosis:   Final diagnoses:   None       Code Status: Full Code    Allergies:   Allergies   Allergen Reactions     Penicillins Hives       Patient Story: pt 15 wks pregnant. Epigastric pain with n/v. Hx of cholecystectomy. Found to have current gall stones. Pt alert and oriented. .   Focused Assessment:  See above    Treatments and/or interventions provided: see chart  Patient's response to treatments and/or interventions: see chart    To be done/followed up on inpatient unit:  see chart    Does this patient have any cognitive concerns?: none    Activity level - Baseline/Home:  Independent  Activity Level - Current:   Independent    Patient's Preferred language: English   Needed?: No    Isolation: None  Infection: Not Applicable  Patient tested for COVID 19 prior to admission: YES  Bariatric?: No    Vital Signs:   Vitals:    05/30/22 0845 05/30/22 0900 05/30/22 0915 05/30/22 0930   BP:  119/61 109/76 106/65   Pulse:    72   Resp:       Temp:       TempSrc:       SpO2: 100% 99% 99% 98%       Cardiac Rhythm:     Was the PSS-3 completed:   Yes  What interventions are required if any?               Family Comments: none here  OBS brochure/video discussed/provided to patient/family: Yes              Name of person given brochure if not patient: pt              Relationship to patient: pt    For the majority of the shift this patient's behavior was Green.   Behavioral interventions performed were none.    ED NURSE PHONE NUMBER: [phone]

## 2022-05-31 ENCOUNTER — PATIENT OUTREACH (OUTPATIENT)
Dept: CARE COORDINATION | Facility: CLINIC | Age: 37
End: 2022-05-31
Payer: COMMERCIAL

## 2022-05-31 DIAGNOSIS — Z71.89 OTHER SPECIFIED COUNSELING: ICD-10-CM

## 2022-05-31 LAB — BACTERIA UR CULT: NORMAL

## 2022-05-31 NOTE — PROGRESS NOTES
Clinic Care Coordination Contact  [facility]/Voicemail       Clinical Data: Care Coordinator Outreach  Outreach attempted x 1.  Left message on patient's voicemail with call back information and requested return call.  Plan:  Care Coordinator will try to reach patient again in 1-2 business days.    Onel Lopez  Community Health Worker  [facility] Care [facility]  Ph:[phone]

## 2022-06-01 NOTE — PROGRESS NOTES
Clinic Care Coordination Contact  [facility]/Voicemail       Clinical Data: Care Coordinator Outreach  Outreach attempted x 2.  Left message on patient's voicemail with call back information and requested return call.  Plan:  Care Coordinator will do no further outreaches at this time.    Onel Lopez  Community Health Worker  [facility] Care [facility]  Ph:[phone]

## 2022-08-14 ENCOUNTER — HOSPITAL ENCOUNTER (INPATIENT)
Facility: CLINIC | Age: 37
LOS: 1 days | Discharge: HOME | End: 2022-08-15
Attending: EMERGENCY MEDICINE | Admitting: OBSTETRICS & GYNECOLOGY
Payer: COMMERCIAL

## 2022-08-14 ENCOUNTER — APPOINTMENT (OUTPATIENT)
Dept: ULTRASOUND IMAGING | Facility: CLINIC | Age: 37
End: 2022-08-14
Attending: EMERGENCY MEDICINE
Payer: COMMERCIAL

## 2022-08-14 DIAGNOSIS — O23.02 PYELONEPHRITIS AFFECTING PREGNANCY IN SECOND TRIMESTER: ICD-10-CM

## 2022-08-14 DIAGNOSIS — R10.9 FLANK PAIN: ICD-10-CM

## 2022-08-14 LAB
ALBUMIN UR-MCNC: 20 MG/DL
AMORPH CRY #/AREA URNS HPF: ABNORMAL /HPF
ANION GAP SERPL CALCULATED.3IONS-SCNC: 5 MMOL/L (ref 3–14)
APPEARANCE UR: ABNORMAL
BASOPHILS # BLD AUTO: 0 10E3/UL (ref 0–0.2)
BASOPHILS NFR BLD AUTO: 0 %
BILIRUB UR QL STRIP: NEGATIVE
BUN SERPL-MCNC: 8 MG/DL (ref 7–30)
CALCIUM SERPL-MCNC: 8.7 MG/DL (ref 8.5–10.1)
CHLORIDE BLD-SCNC: 108 MMOL/L (ref 94–109)
CO2 SERPL-SCNC: 23 MMOL/L (ref 20–32)
COLOR UR AUTO: ABNORMAL
CREAT SERPL-MCNC: 0.37 MG/DL (ref 0.52–1.04)
EOSINOPHIL # BLD AUTO: 0.1 10E3/UL (ref 0–0.7)
EOSINOPHIL NFR BLD AUTO: 1 %
ERYTHROCYTE [DISTWIDTH] IN BLOOD BY AUTOMATED COUNT: 12.8 % (ref 10–15)
GFR SERPL CREATININE-BSD FRML MDRD: >90 ML/MIN/1.73M2
GLUCOSE BLD-MCNC: 87 MG/DL (ref 70–99)
GLUCOSE UR STRIP-MCNC: NEGATIVE MG/DL
HCT VFR BLD AUTO: 35.3 % (ref 35–47)
HGB BLD-MCNC: 12.1 G/DL (ref 11.7–15.7)
HGB UR QL STRIP: ABNORMAL
IMM GRANULOCYTES # BLD: 0.1 10E3/UL
IMM GRANULOCYTES NFR BLD: 1 %
KETONES UR STRIP-MCNC: NEGATIVE MG/DL
LEUKOCYTE ESTERASE UR QL STRIP: ABNORMAL
LYMPHOCYTES # BLD AUTO: 1.9 10E3/UL (ref 0.8–5.3)
LYMPHOCYTES NFR BLD AUTO: 15 %
MCH RBC QN AUTO: 31.8 PG (ref 26.5–33)
MCHC RBC AUTO-ENTMCNC: 34.3 G/DL (ref 31.5–36.5)
MCV RBC AUTO: 93 FL (ref 78–100)
MONOCYTES # BLD AUTO: 0.7 10E3/UL (ref 0–1.3)
MONOCYTES NFR BLD AUTO: 6 %
MUCOUS THREADS #/AREA URNS LPF: PRESENT /LPF
NEUTROPHILS # BLD AUTO: 9.7 10E3/UL (ref 1.6–8.3)
NEUTROPHILS NFR BLD AUTO: 77 %
NITRATE UR QL: NEGATIVE
NRBC # BLD AUTO: 0 10E3/UL
NRBC BLD AUTO-RTO: 0 /100
PH UR STRIP: 7 [PH] (ref 5–7)
PLATELET # BLD AUTO: 187 10E3/UL (ref 150–450)
POTASSIUM BLD-SCNC: 3.9 MMOL/L (ref 3.4–5.3)
RBC # BLD AUTO: 3.81 10E6/UL (ref 3.8–5.2)
RBC URINE: 13 /HPF
SARS-COV-2 RNA RESP QL NAA+PROBE: NEGATIVE
SODIUM SERPL-SCNC: 136 MMOL/L (ref 133–144)
SP GR UR STRIP: 1.02 (ref 1–1.03)
SQUAMOUS EPITHELIAL: 31 /HPF
UROBILINOGEN UR STRIP-MCNC: NORMAL MG/DL
WBC # BLD AUTO: 12.6 10E3/UL (ref 4–11)
WBC URINE: 16 /HPF

## 2022-08-14 PROCEDURE — 96365 THER/PROPH/DIAG IV INF INIT: CPT

## 2022-08-14 PROCEDURE — 87086 URINE CULTURE/COLONY COUNT: CPT | Performed by: EMERGENCY MEDICINE

## 2022-08-14 PROCEDURE — 76770 US EXAM ABDO BACK WALL COMP: CPT

## 2022-08-14 PROCEDURE — U0003 INFECTIOUS AGENT DETECTION BY NUCLEIC ACID (DNA OR RNA); SEVERE ACUTE RESPIRATORY SYNDROME CORONAVIRUS 2 (SARS-COV-2) (CORONAVIRUS DISEASE [COVID-19]), AMPLIFIED PROBE TECHNIQUE, MAKING USE OF HIGH THROUGHPUT TECHNOLOGIES AS DESCRIBED BY CMS-2020-01-R: HCPCS | Performed by: EMERGENCY MEDICINE

## 2022-08-14 PROCEDURE — 81001 URINALYSIS AUTO W/SCOPE: CPT | Performed by: EMERGENCY MEDICINE

## 2022-08-14 PROCEDURE — G0378 HOSPITAL OBSERVATION PER HR: HCPCS

## 2022-08-14 PROCEDURE — 250N000013 HC RX MED GY IP 250 OP 250 PS 637: Performed by: EMERGENCY MEDICINE

## 2022-08-14 PROCEDURE — 96375 TX/PRO/DX INJ NEW DRUG ADDON: CPT

## 2022-08-14 PROCEDURE — 258N000003 HC RX IP 258 OP 636: Performed by: OBSTETRICS & GYNECOLOGY

## 2022-08-14 PROCEDURE — 250N000013 HC RX MED GY IP 250 OP 250 PS 637: Performed by: OBSTETRICS & GYNECOLOGY

## 2022-08-14 PROCEDURE — 120N000001 HC R&B MED SURG/OB

## 2022-08-14 PROCEDURE — 80048 BASIC METABOLIC PNL TOTAL CA: CPT | Performed by: EMERGENCY MEDICINE

## 2022-08-14 PROCEDURE — 250N000011 HC RX IP 250 OP 636: Performed by: OBSTETRICS & GYNECOLOGY

## 2022-08-14 PROCEDURE — 99285 EMERGENCY DEPT VISIT HI MDM: CPT | Mod: 25

## 2022-08-14 PROCEDURE — 96376 TX/PRO/DX INJ SAME DRUG ADON: CPT

## 2022-08-14 PROCEDURE — 36415 COLL VENOUS BLD VENIPUNCTURE: CPT | Performed by: EMERGENCY MEDICINE

## 2022-08-14 PROCEDURE — 250N000011 HC RX IP 250 OP 636: Performed by: EMERGENCY MEDICINE

## 2022-08-14 PROCEDURE — 85025 COMPLETE CBC W/AUTO DIFF WBC: CPT | Performed by: EMERGENCY MEDICINE

## 2022-08-14 PROCEDURE — C9803 HOPD COVID-19 SPEC COLLECT: HCPCS

## 2022-08-14 RX ORDER — BISACODYL 10 MG
10 SUPPOSITORY, RECTAL RECTAL DAILY PRN
Status: DISCONTINUED | OUTPATIENT
Start: 2022-08-16 | End: 2022-08-15 | Stop reason: HOSPADM

## 2022-08-14 RX ORDER — NICOTINE 21 MG/24HR
1 PATCH, TRANSDERMAL 24 HOURS TRANSDERMAL DAILY
Status: DISCONTINUED | OUTPATIENT
Start: 2022-08-14 | End: 2022-08-15 | Stop reason: HOSPADM

## 2022-08-14 RX ORDER — NALOXONE HYDROCHLORIDE 0.4 MG/ML
0.2 INJECTION, SOLUTION INTRAMUSCULAR; INTRAVENOUS; SUBCUTANEOUS
Status: DISCONTINUED | OUTPATIENT
Start: 2022-08-14 | End: 2022-08-15 | Stop reason: HOSPADM

## 2022-08-14 RX ORDER — SODIUM CHLORIDE, SODIUM LACTATE, POTASSIUM CHLORIDE, CALCIUM CHLORIDE 600; 310; 30; 20 MG/100ML; MG/100ML; MG/100ML; MG/100ML
INJECTION, SOLUTION INTRAVENOUS CONTINUOUS
Status: DISCONTINUED | OUTPATIENT
Start: 2022-08-14 | End: 2022-08-15 | Stop reason: HOSPADM

## 2022-08-14 RX ORDER — ONDANSETRON 2 MG/ML
4 INJECTION INTRAMUSCULAR; INTRAVENOUS EVERY 6 HOURS PRN
Status: DISCONTINUED | OUTPATIENT
Start: 2022-08-14 | End: 2022-08-15 | Stop reason: HOSPADM

## 2022-08-14 RX ORDER — ONDANSETRON 4 MG/1
4 TABLET, ORALLY DISINTEGRATING ORAL EVERY 6 HOURS PRN
Status: DISCONTINUED | OUTPATIENT
Start: 2022-08-14 | End: 2022-08-15 | Stop reason: HOSPADM

## 2022-08-14 RX ORDER — AMOXICILLIN 250 MG
1 CAPSULE ORAL 2 TIMES DAILY
Status: DISCONTINUED | OUTPATIENT
Start: 2022-08-14 | End: 2022-08-15 | Stop reason: HOSPADM

## 2022-08-14 RX ORDER — PROCHLORPERAZINE MALEATE 5 MG
10 TABLET ORAL EVERY 6 HOURS PRN
Status: DISCONTINUED | OUTPATIENT
Start: 2022-08-14 | End: 2022-08-15 | Stop reason: HOSPADM

## 2022-08-14 RX ORDER — SIMETHICONE 80 MG
80 TABLET,CHEWABLE ORAL PRN
COMMUNITY

## 2022-08-14 RX ORDER — CEFTRIAXONE 1 G/1
1 INJECTION, POWDER, FOR SOLUTION INTRAMUSCULAR; INTRAVENOUS EVERY 24 HOURS
Status: DISCONTINUED | OUTPATIENT
Start: 2022-08-15 | End: 2022-08-15 | Stop reason: HOSPADM

## 2022-08-14 RX ORDER — METOCLOPRAMIDE HYDROCHLORIDE 5 MG/ML
10 INJECTION INTRAMUSCULAR; INTRAVENOUS EVERY 6 HOURS PRN
Status: DISCONTINUED | OUTPATIENT
Start: 2022-08-14 | End: 2022-08-15 | Stop reason: HOSPADM

## 2022-08-14 RX ORDER — DIPHENHYDRAMINE HYDROCHLORIDE 50 MG/ML
25 INJECTION INTRAMUSCULAR; INTRAVENOUS EVERY 6 HOURS PRN
Status: DISCONTINUED | OUTPATIENT
Start: 2022-08-14 | End: 2022-08-15 | Stop reason: HOSPADM

## 2022-08-14 RX ORDER — CEFTRIAXONE 1 G/1
1 INJECTION, POWDER, FOR SOLUTION INTRAMUSCULAR; INTRAVENOUS ONCE
Status: COMPLETED | OUTPATIENT
Start: 2022-08-14 | End: 2022-08-14

## 2022-08-14 RX ORDER — NALOXONE HYDROCHLORIDE 0.4 MG/ML
0.4 INJECTION, SOLUTION INTRAMUSCULAR; INTRAVENOUS; SUBCUTANEOUS
Status: DISCONTINUED | OUTPATIENT
Start: 2022-08-14 | End: 2022-08-15 | Stop reason: HOSPADM

## 2022-08-14 RX ORDER — ASPIRIN 81 MG/1
81 TABLET ORAL PRN
COMMUNITY

## 2022-08-14 RX ORDER — ACETAMINOPHEN 500 MG
1000 TABLET ORAL ONCE
Status: COMPLETED | OUTPATIENT
Start: 2022-08-14 | End: 2022-08-14

## 2022-08-14 RX ORDER — MORPHINE SULFATE 4 MG/ML
4 INJECTION, SOLUTION INTRAMUSCULAR; INTRAVENOUS ONCE
Status: COMPLETED | OUTPATIENT
Start: 2022-08-14 | End: 2022-08-14

## 2022-08-14 RX ORDER — MAGNESIUM HYDROXIDE/ALUMINUM HYDROXICE/SIMETHICONE 120; 1200; 1200 MG/30ML; MG/30ML; MG/30ML
30 SUSPENSION ORAL
Status: DISCONTINUED | OUTPATIENT
Start: 2022-08-14 | End: 2022-08-15 | Stop reason: HOSPADM

## 2022-08-14 RX ORDER — AMOXICILLIN 250 MG
2 CAPSULE ORAL 2 TIMES DAILY
Status: DISCONTINUED | OUTPATIENT
Start: 2022-08-14 | End: 2022-08-15 | Stop reason: HOSPADM

## 2022-08-14 RX ORDER — CEFTRIAXONE 1 G/1
1 INJECTION, POWDER, FOR SOLUTION INTRAMUSCULAR; INTRAVENOUS EVERY 24 HOURS
Status: DISCONTINUED | OUTPATIENT
Start: 2022-08-16 | End: 2022-08-14

## 2022-08-14 RX ORDER — PROCHLORPERAZINE 25 MG
25 SUPPOSITORY, RECTAL RECTAL EVERY 12 HOURS PRN
Status: DISCONTINUED | OUTPATIENT
Start: 2022-08-14 | End: 2022-08-15 | Stop reason: HOSPADM

## 2022-08-14 RX ORDER — PANTOPRAZOLE SODIUM 40 MG/1
40 TABLET, DELAYED RELEASE ORAL DAILY
COMMUNITY

## 2022-08-14 RX ORDER — METOCLOPRAMIDE 10 MG/1
10 TABLET ORAL EVERY 6 HOURS PRN
Status: DISCONTINUED | OUTPATIENT
Start: 2022-08-14 | End: 2022-08-15 | Stop reason: HOSPADM

## 2022-08-14 RX ORDER — MAGNESIUM HYDROXIDE/ALUMINUM HYDROXICE/SIMETHICONE 120; 1200; 1200 MG/30ML; MG/30ML; MG/30ML
30 SUSPENSION ORAL EVERY 4 HOURS PRN
COMMUNITY

## 2022-08-14 RX ORDER — SIMETHICONE 80 MG
160 TABLET,CHEWABLE ORAL EVERY 4 HOURS PRN
Status: DISCONTINUED | OUTPATIENT
Start: 2022-08-14 | End: 2022-08-15 | Stop reason: HOSPADM

## 2022-08-14 RX ORDER — ONDANSETRON 2 MG/ML
4 INJECTION INTRAMUSCULAR; INTRAVENOUS EVERY 30 MIN PRN
Status: DISCONTINUED | OUTPATIENT
Start: 2022-08-14 | End: 2022-08-15 | Stop reason: HOSPADM

## 2022-08-14 RX ORDER — DIPHENHYDRAMINE HCL 25 MG
25 CAPSULE ORAL EVERY 6 HOURS PRN
Status: DISCONTINUED | OUTPATIENT
Start: 2022-08-14 | End: 2022-08-15 | Stop reason: HOSPADM

## 2022-08-14 RX ORDER — OXYCODONE HYDROCHLORIDE 5 MG/1
5 TABLET ORAL EVERY 4 HOURS PRN
Status: DISCONTINUED | OUTPATIENT
Start: 2022-08-14 | End: 2022-08-15 | Stop reason: HOSPADM

## 2022-08-14 RX ORDER — ACETAMINOPHEN 325 MG/1
650 TABLET ORAL EVERY 4 HOURS PRN
Status: DISCONTINUED | OUTPATIENT
Start: 2022-08-14 | End: 2022-08-15 | Stop reason: HOSPADM

## 2022-08-14 RX ORDER — PANTOPRAZOLE SODIUM 20 MG/1
20 TABLET, DELAYED RELEASE ORAL
Status: DISCONTINUED | OUTPATIENT
Start: 2022-08-15 | End: 2022-08-15 | Stop reason: HOSPADM

## 2022-08-14 RX ORDER — CALCIUM CARBONATE 500 MG/1
1 TABLET, CHEWABLE ORAL 2 TIMES DAILY
COMMUNITY
End: 2022-08-14

## 2022-08-14 RX ORDER — ASPIRIN 81 MG/1
81 TABLET, CHEWABLE ORAL DAILY
Status: DISCONTINUED | OUTPATIENT
Start: 2022-08-14 | End: 2022-08-15 | Stop reason: HOSPADM

## 2022-08-14 RX ORDER — PRENATAL VIT/IRON FUM/FOLIC AC 27MG-0.8MG
1 TABLET ORAL DAILY
Status: DISCONTINUED | OUTPATIENT
Start: 2022-08-14 | End: 2022-08-15 | Stop reason: HOSPADM

## 2022-08-14 RX ORDER — HYDROXYZINE HYDROCHLORIDE 25 MG/1
50 TABLET, FILM COATED ORAL
Status: DISCONTINUED | OUTPATIENT
Start: 2022-08-14 | End: 2022-08-15 | Stop reason: HOSPADM

## 2022-08-14 RX ORDER — MORPHINE SULFATE 4 MG/ML
4 INJECTION, SOLUTION INTRAMUSCULAR; INTRAVENOUS
Status: DISCONTINUED | OUTPATIENT
Start: 2022-08-14 | End: 2022-08-15 | Stop reason: HOSPADM

## 2022-08-14 RX ORDER — CEFTRIAXONE 1 G/1
1 INJECTION, POWDER, FOR SOLUTION INTRAMUSCULAR; INTRAVENOUS EVERY 24 HOURS
Status: DISCONTINUED | OUTPATIENT
Start: 2022-08-15 | End: 2022-08-14

## 2022-08-14 RX ADMIN — OXYCODONE HYDROCHLORIDE 5 MG: 5 TABLET ORAL at 16:31

## 2022-08-14 RX ADMIN — SODIUM CHLORIDE, POTASSIUM CHLORIDE, SODIUM LACTATE AND CALCIUM CHLORIDE: 600; 310; 30; 20 INJECTION, SOLUTION INTRAVENOUS at 22:32

## 2022-08-14 RX ADMIN — NICOTINE 1 PATCH: 21 PATCH, EXTENDED RELEASE TRANSDERMAL at 17:07

## 2022-08-14 RX ADMIN — MORPHINE SULFATE 4 MG: 4 INJECTION, SOLUTION INTRAMUSCULAR; INTRAVENOUS at 10:43

## 2022-08-14 RX ADMIN — METOCLOPRAMIDE 10 MG: 5 INJECTION, SOLUTION INTRAMUSCULAR; INTRAVENOUS at 18:12

## 2022-08-14 RX ADMIN — MORPHINE SULFATE 4 MG: 4 INJECTION, SOLUTION INTRAMUSCULAR; INTRAVENOUS at 08:26

## 2022-08-14 RX ADMIN — ACETAMINOPHEN 1000 MG: 500 TABLET, FILM COATED ORAL at 09:08

## 2022-08-14 RX ADMIN — ONDANSETRON 4 MG: 2 INJECTION INTRAMUSCULAR; INTRAVENOUS at 10:43

## 2022-08-14 RX ADMIN — SODIUM CHLORIDE, POTASSIUM CHLORIDE, SODIUM LACTATE AND CALCIUM CHLORIDE 500 ML: 600; 310; 30; 20 INJECTION, SOLUTION INTRAVENOUS at 18:05

## 2022-08-14 RX ADMIN — CEFTRIAXONE SODIUM 1 G: 1 INJECTION, POWDER, FOR SOLUTION INTRAMUSCULAR; INTRAVENOUS at 10:43

## 2022-08-14 RX ADMIN — ACETAMINOPHEN 650 MG: 325 TABLET ORAL at 16:31

## 2022-08-14 ASSESSMENT — ACTIVITIES OF DAILY LIVING (ADL)
ADLS_ACUITY_SCORE: 18
ADLS_ACUITY_SCORE: 18
ADLS_ACUITY_SCORE: 35
ADLS_ACUITY_SCORE: 35
FALL_HISTORY_WITHIN_LAST_SIX_MONTHS: NO
ADLS_ACUITY_SCORE: 18

## 2022-08-14 ASSESSMENT — ENCOUNTER SYMPTOMS
FLANK PAIN: 1
DYSURIA: 0
VOMITING: 0
NUMBNESS: 0
WEAKNESS: 0
BACK PAIN: 1
HEMATURIA: 0
ROS GI COMMENTS: (-) INCONTINENCE
NAUSEA: 0
DIAPHORESIS: 0

## 2022-08-14 NOTE — PHARMACY-ADMISSION MEDICATION HISTORY
Pharmacy Medication History  Admission medication history interview status for the 8/14/2022  admission is complete. See EPIC admission navigator for prior to admission medications     Location of Interview: Phone  Medication history sources: Patient and Surescripts    Significant changes made to the medication list:  Added:  Antacid suspension  Gas-x  Pantoprazole  Aspirin    In the past week, patient estimated taking medication this percent of the time: less than 50% due to other    Medication reconciliation completed by provider prior to medication history? No    Time spent in this activity: 20 minutes    Prior to Admission medications    Medication Sig Last Dose Taking? Auth Provider Long Term End Date   alum & mag hydroxide-simethicone (MAALOX) 200-200-20 MG/5ML SUSP suspension Take 30 mLs by mouth every 4 hours as needed for indigestion prn Yes Unknown, Entered By History     aspirin 81 MG EC tablet Take 81 mg by mouth as needed Past Week at Unknown time Yes Unknown, Entered By History     pantoprazole (PROTONIX) 40 MG EC tablet Take 40 mg by mouth daily Past Week at Unknown time Yes Unknown, Entered By History     Simethicone (GAS-X PO) Take 1 tablet by mouth as needed for intestinal gas Strength unknown prn Yes Unknown, Entered By History         The information provided in this note is only as accurate as the sources available at the time of update(s) \  Klaudia Bridges  Pharmacy Intern

## 2022-08-14 NOTE — PLAN OF CARE
Data: Patient admitted to room 211 at 1135. Patient is a . Prenatal record reviewed.   OB History    Para Term  AB Living   9 5 5 0 3 6   SAB IAB Ectopic Multiple Live Births   2 1 0 1 0      # Outcome Date GA Lbr Marty/2nd Weight Sex Delivery Anes PTL Lv   9 Current            8 Term            7 Term            6 Term            5 Term            4 Term            3 IAB            2 SAB            1 SAB            .  Medical History:   Past Medical History:   Diagnosis Date     Mild intermittent asthma    .  Gestational age 26w0d. Vital signs per doc flowsheet. Fetal movement present. Patient reports Flank Pain   as reason for admission. Support persons Aroldo present.  Action: Report from LUISA Root obtained at 1120. Care of patient assumed at 1135. Admission assessment completed. Patient and support persons educated on antepartum care while in the hospital. Patient instructed to report change in fetal movement, contractions, vaginal leaking of fluid or bleeding, abdominal pain, or any concerns related to the pregnancy to her nurse/physician. Patient oriented to room, call light in reach.   Response: Dr. Lopez informed pt is on the unit. Plan per provider is see previous note. Patient verbalized understanding of education and verbalized agreement with plan.

## 2022-08-14 NOTE — ED PROVIDER NOTES
History   Chief Complaint:  Flank Pain     The history is provided by the patient.      Carlitos Melendez is a 25 week pregnant 37 year old female with history of choledocholithiasis, polynephritis, and hyperemesis gravidarum who presents with intermittent severe left-sided back and flank pain which started this morning suddenly. The pain radiates to her left leg. She states the pain come and goes. She has not taken any medications for the pain. She denies urinary issues, burning or pain with urination, hematuria, nausea, vomiting, diaphoresis, vaginal bleeding, loss of fluids or pelvic/abdominal contractions. She also denies numbness, weakness, or problems with uncontrollable urination or defecation. There has been no bleeding. She has been having a healthy pregnancy and is followed by an OB GYN at [facility] [place]. She notes an allergy to Penicillins. 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    Review of Systems   Constitutional: Negative for diaphoresis.   Gastrointestinal: Negative for nausea and vomiting.        (-) incontinence   Genitourinary: Positive for flank pain (Left). Negative for dysuria, hematuria and vaginal bleeding.        (-) incontinence   Musculoskeletal: Positive for back pain (Left, radiates to left leg).   Neurological: Negative for weakness and numbness.   All other systems reviewed and are negative.    Allergies:  Penicillins    Medications:  Alum-mag hydroxide-simethicone    Past Medical History:     Choledocholithiasis  Pyelonephritis  Tobacco use  Cannabis use  Abnormal pap smear  UTI  Asthma  Hypokalemia  Migraine  Hyperemesis gravidarum     Past Surgical History:    Cholecystectomy   section  Abdomen surgery proc unlisted     Social History:  PCP: No Ref-Primary, Physician   Patient presents with spouse  Patient entered by car  Works in security    Physical Exam     Patient Vitals for the past 24 hrs:   BP Temp Temp src Pulse Resp SpO2 Height Weight  "  08/14/22 1145 107/62 97.7  F (36.5  C) Temporal -- 16 -- -- --   08/14/22 1141 -- -- -- -- -- -- 1.651 m (5' 5\") 83.5 kg (184 lb)   08/14/22 1140 -- 97.7  F (36.5  C) Temporal -- -- -- -- --   08/14/22 1127 -- -- -- -- -- 96 % -- --   08/14/22 1100 -- -- -- -- -- 99 % -- --   08/14/22 1055 -- -- -- -- -- 99 % -- --   08/14/22 0900 -- -- -- -- -- 96 % -- --   08/14/22 0722 122/71 97.4  F (36.3  C) Temporal 82 16 98 % 1.651 m (5' 5\") 83.5 kg (184 lb)     Physical Exam  General: Well-nourished, appears to be in pain  Eyes: PERRL, conjunctivae pink no scleral icterus or conjunctival injection  ENT:  Moist mucus membranes, posterior oropharynx clear without erythema or exudates  Respiratory:  Lungs clear to auscultation bilaterally, no crackles/rubs/wheezes.  Good air movement  CV: Normal rate and rhythm, no murmurs/rubs/gallops  GI:  Abdomen soft and non-distended.  Normoactive BS.  No tenderness, guarding or rebound  Skin: Warm, dry.  No rashes or petechiae. No zoster rash  Musculoskeletal: No peripheral edema or calf tenderness. No midline tenderness to palpation. +left flank and left lumbar spinal tenderness  Neuro: Alert and oriented to person/place/time. Normal saddle sensation.  Normal and symmetric reflexes at patella tendon bilaterally.  Normal and symmetric strength at BLE.  Psychiatric: Tearful affect    Emergency Department Course     Imaging:  US Renal Complete   Final Result   IMPRESSION:   1.  Mild left hydroureteronephrosis.   2.  Mild right pelviectasis.   3.  The bladder is moderately distended.        Report per radiology    Laboratory:  Labs Ordered and Resulted from Time of ED Arrival to Time of ED Departure   ROUTINE UA WITH MICROSCOPIC REFLEX TO CULTURE - Abnormal       Result Value    Color Urine Straw      Appearance Urine Slightly Cloudy (*)     Glucose Urine Negative      Bilirubin Urine Negative      Ketones Urine Negative      Specific Gravity Urine 1.018      Blood Urine Trace (*)     pH " Urine 7.0      Protein Albumin Urine 20  (*)     Urobilinogen Urine Normal      Nitrite Urine Negative      Leukocyte Esterase Urine Small (*)     Mucus Urine Present (*)     Amorphous Crystals Urine Few (*)     RBC Urine 13 (*)     WBC Urine 16 (*)     Squamous Epithelials Urine 31 (*)    BASIC METABOLIC PANEL - Abnormal    Sodium 136      Potassium 3.9      Chloride 108      Carbon Dioxide (CO2) 23      Anion Gap 5      Urea Nitrogen 8      Creatinine 0.37 (*)     Calcium 8.7      Glucose 87      GFR Estimate >90     CBC WITH PLATELETS AND DIFFERENTIAL - Abnormal    WBC Count 12.6 (*)     RBC Count 3.81      Hemoglobin 12.1      Hematocrit 35.3      MCV 93      MCH 31.8      MCHC 34.3      RDW 12.8      Platelet Count 187      % Neutrophils 77      % Lymphocytes 15      % Monocytes 6      % Eosinophils 1      % Basophils 0      % Immature Granulocytes 1      NRBCs per 100 WBC 0      Absolute Neutrophils 9.7 (*)     Absolute Lymphocytes 1.9      Absolute Monocytes 0.7      Absolute Eosinophils 0.1      Absolute Basophils 0.0      Absolute Immature Granulocytes 0.1      Absolute NRBCs 0.0     COVID-19 VIRUS (CORONAVIRUS) BY PCR - Normal    SARS CoV2 PCR Negative     URINE CULTURE      Emergency Department Course:     Reviewed:  I reviewed nursing notes, vitals, past medical history and Care Everywhere.    Assessments:  0805 I obtained history and examined the patient as noted above.   0925 I rechecked the patient and explained findings. I discussed plan for transfer to OR and the patient is in agreement.    Consults:  0930 I consulted with the pharmacist.  1055 I consulted with Dr. Lopez, hospitalist, regarding the patient's history and presentation here in the emergency department who accepted the patient for admission with specialty care with OB.    Interventions:  0826 Morphine injection 4 mg IV  0908 Acetaminophen 1000 mg PO  1043 Ceftriaxone 1 g IV  1043 Morphine injection 4 mg IV  1043 Ondansetron 4 mg  IV    Disposition:  The patient was admitted to the hospital under the care of Dr. Lopez.     Impression & Plan     Medical Decision Making:  Carlitos Melendez is a 37 year old female 25 weeks pregnant who comes with severe intermittent left flank pain.  She has a history of kidney stones.  She has a history of pyelonephritis.  It is also possible that this is sciatica as it does radiate down her leg and get worse with leg movement.  She does not seem to have abdominal contractions or loss of fluid to suggest labor.  Her monitoring in the emergency department was reassuring.  UA is concerning for possible infection.  Ultrasound is concerning for some mild hydronephrosis.  Obviously, given her pregnancy, were not able to obtain CT imaging.  We will treat her for possible pyelonephritis with antibiotics.  We will admit her for ongoing parenteral antibiotics and pain control.  I discussed with Dr. Lopez on-call for OB/GYN who graciously agreed to admit the patient as she is unassigned at our hospital.  Patient was in agreement the plan as well.    Diagnosis:    ICD-10-CM    1. Flank pain  R10.9    2. Pyelonephritis affecting pregnancy in second trimester  O23.02      Scribe Disclosure:  IShabnam, am serving as a scribe at 8:04 AM on 8/14/2022 to document services personally performed by Guera Miller MD based on my observations and the provider's statements to me.     IKavitha, am serving as a scribe at 8:04 AM on 8/14/2022 to document services personally performed by Guera Miller MD based on my observations and the provider's statements to me.     Guera Miller MD  08/14/22 8849

## 2022-08-14 NOTE — PLAN OF CARE
OB RN called to bedside to assess fetal well being, external monitors placed with pts consent.  Pt denies uterine contractions, denies any vaginal bleeding or leaking of fluid.   EDC per pt, 22, which places pt at 26 weeks gestation today.  .   Pt states she has a scheduled  section on 22, with [facility], either at [facility] or [facility].   Of note, pt had just received at dose of morphine prior to fetal monitoring.    Monitoring from [id number], reassuring tracing, see flowsheet, no decels noted.

## 2022-08-14 NOTE — H&P
[facility] Labor and Delivery History and Physical    Carlitos Melendez MRN# [id number]   Age: 37 year old YOB: 1985     Date of Admission:  2022    Primary care provider: No Ref-Primary, Physician           Chief Complaint:   Carlitos Melendez is a 37 year old female who is 26w0d pregnant and being admitted for pyelonephritis.          Pregnancy history:     OBSTETRIC HISTORY:    OB History    Para Term  AB Living   9 5 5 0 3 6   SAB IAB Ectopic Multiple Live Births   2 1 0 1 0      # Outcome Date GA Lbr Marty/2nd Weight Sex Delivery Anes PTL Lv   9 Current            8 Term            7 Term            6 Term            5 Term            4 Term            3 IAB            2 SAB            1 SAB                EDC: Estimated Date of Delivery: 2022    Prenatal Labs:   Lab Results   Component Value Date    HGB 12.1 2022       GBS Status:   No results found for: GBS    Active Problem List  Patient Active Problem List   Diagnosis     Suspected COVID-19 virus infection     Choledocholithiasis     Second trimester pregnancy     Flank pain     Pyelonephritis affecting pregnancy in second trimester       Medication Prior to Admission  Medications Prior to Admission   Medication Sig Dispense Refill Last Dose     alum & mag hydroxide-simethicone (MAALOX) 200-200-20 MG/5ML SUSP suspension Take 30 mLs by mouth every 4 hours as needed for indigestion   prn     aspirin 81 MG EC tablet Take 81 mg by mouth as needed   Past Week at Unknown time     pantoprazole (PROTONIX) 40 MG EC tablet Take 40 mg by mouth daily   Past Week at Unknown time     simethicone (MYLICON) 80 MG chewable tablet Take 80 mg by mouth as needed for intestinal gas Strength unknown   prn   .        Maternal Past Medical History:     Past Medical History:   Diagnosis Date     Mild intermittent asthma    History of nephrolithiasis    Past Surgical history:  History of  delivery x 4  History of  cholecystectomy                    Family History:   This patient has no significant family history            Social History:     Social History     Tobacco Use     Smoking status: Current Every Day Smoker     Packs/day: 1.00     Types: Cigarettes     Smokeless tobacco: Never Used   Substance Use Topics     Alcohol use: Never   Partner x 3 years.  He is step father to her 6 children.  Works as security at [employer].  1 pack per day smoker.    Kids ages 5-16 at home.         Review of Systems:   C: NEGATIVE for fever, chills, change in weight  E/M: NEGATIVE for ear, mouth and throat problems  R: NEGATIVE for significant cough or SOB  CV: NEGATIVE for chest pain, palpitations or peripheral edema   : Left sided flank pain improved after IV morphine and antibiotics.    Denies any contractions, vaginal bleeding or loss of fluid.  Good FM.           Physical Exam:   Vitals were reviewed    Constitutional: Awake, alert, cooperative, no apparent distress, and appears stated age.  ENT: Normocephalic, without obvious abnormality, atramatic  Lungs: No increased work of breathing, good air exchange, clear to auscultation bilaterally, no crackles or wheezing.  Cardiovascular: Regular rate and rhythm, normal S1 and S2, no S3 or S4, and no murmur noted.  Abdomen: No scars, normal bowel sounds, soft, non-distended, non-tender, no masses palpated, no hepatosplenomegally.  + left flank pain  Genitourinary: Deferred  Musculoskeletal: No redness, warmth, or swelling of the joints.  Full range of motion noted.    Neurologic: Awake, alert, oriented to name, place and time.  Cranial nerves II-XII are grossly intact.  Neuropsychiatric: Normal affect, mood, orientation, memory and insight.  Skin: No rashes, erythema, pallor, petechia or purpura.     Cervix:   Exam deferred.    Fetal Heart Rate Tracins, mod variability, gestational age appropriate  Tocometer: external monitor, no contractions    Nml fetal survey noted in  chart.     Renal Ultrasound:                                                                       IMPRESSION:  1.  Mild left hydroureteronephrosis.  2.  Mild right pelviectasis.  3.  The bladder is moderately distended.        Assessment:   Carlitos Melendez is a 26w0d pregnant female admitted with pyelonephritis.          Plan:   1.  Left flank pain - strongly suspect pyelonephritis.  Kidney stone less likely.  Pain significantly improved after IV abx and IV morphine.  - strain urine  - encouraged oral hydration  - oral pain medications  - continue IV ceftriaxone  - discussed possible discharge tomorrow depending upon clinical improvement  - recommend keflex 500 mg po Q6H x 10 days at discharge with 500 mg daily at bedtime until delivery.    2.  Fetal status - no active obstetric issues at this time.  - NST Qshift  - Tocho as needed.  - fetal status reassuring.    3.  Nicotine dependence  - nicotine 21 mg patch Q72 hr     Plan of care reviewed with the patient and all questions addressed.        Ivett Lopez MD

## 2022-08-14 NOTE — PLAN OF CARE
Pt requested to smoke one more time prior to nicotine patch applied.  Provider aware and agreed with pt request.  Pt off the unit at this time.  Plan to place nicotine patch once returns.

## 2022-08-14 NOTE — PROVIDER NOTIFICATION
08/14/22 1740   Provider Notification   Provider Name/Title Dr. Lopez   Method of Notification Electronic Page   Request Evaluate - Remote   Notification Reason Patient Request  (PT requesting IV hydration)       Dr. Lopez responded to vtext.  Updated on urine output and pt request for IV fluid.  New orders given for  cc bolus and then 125 ml/hr.

## 2022-08-14 NOTE — PROVIDER NOTIFICATION
08/14/22 1218   Provider Notification   Provider Name/Title Dr. Lopez   Method of Notification Phone   Request Evaluate - Remote   Notification Reason Status Update       Dr. Lopez updated that pt was brought up from the ER.  Provider will come in to see pt and place orders at that time.  Until then pt may be placed on a regular diet and fetal and uterine monitoring Qshift.  Pt has already been monitoring for the day shift while in the ER.

## 2022-08-14 NOTE — ED NOTES
"[facility]  ED Nurse Handoff Report    ED Chief complaint: Flank Pain      ED Diagnosis:   Final diagnoses:   None       Code Status: Full Code    Allergies:   Allergies   Allergen Reactions     Penicillins Hives       Patient Story: 25 week pregnant, woke up with left side flank pain that radiates all the way down the back and into her left leg. No pain meds taken today.   Focused Assessment:  Patient appears to be in a lot of pain on L side, does state that it hurts to move her L leg as well.     Treatments and/or interventions provided: pain mgt, baby monitoring per OB, renal US.   Patient's response to treatments and/or interventions: tolerating well but still having pain.     To be done/followed up on inpatient unit:  abx    Does this patient have any cognitive concerns?: none    Activity level - Baseline/Home:  Independent  Activity Level - Current:   Independent    Patient's Preferred language: English   Needed?: No    Isolation: None  Infection: Not Applicable  Patient tested for COVID 19 prior to admission: YES  Bariatric?: No    Vital Signs:   Vitals:    08/14/22 0722 08/14/22 0900   BP: 122/71    Pulse: 82    Resp: 16    Temp: 97.4  F (36.3  C)    TempSrc: Temporal    SpO2: 98% 96%   Weight: 83.5 kg (184 lb)    Height: 1.651 m (5' 5\")        Cardiac Rhythm:     Was the PSS-3 completed:   Yes  What interventions are required if any?               Family Comments: SO at bedside   OBS brochure/video discussed/provided to patient/family: Yes              Name of person given brochure if not patient:               Relationship to patient:     For the majority of the shift this patient's behavior was Green.   Behavioral interventions performed were .    ED NURSE PHONE NUMBER: *[id number]         "

## 2022-08-15 ENCOUNTER — HOSPITAL ENCOUNTER (INPATIENT)
Facility: CLINIC | Age: 37
End: 2022-08-15
Admitting: OBSTETRICS & GYNECOLOGY
Payer: COMMERCIAL

## 2022-08-15 VITALS
OXYGEN SATURATION: 99 % | DIASTOLIC BLOOD PRESSURE: 64 MMHG | TEMPERATURE: 98.2 F | SYSTOLIC BLOOD PRESSURE: 118 MMHG | WEIGHT: 184 LBS | HEIGHT: 65 IN | BODY MASS INDEX: 30.66 KG/M2 | HEART RATE: 70 BPM | RESPIRATION RATE: 16 BRPM

## 2022-08-15 LAB
ABO/RH(D): NORMAL
BACTERIA UR CULT: NORMAL
BASOPHILS # BLD AUTO: 0 10E3/UL (ref 0–0.2)
BASOPHILS NFR BLD AUTO: 0 %
EOSINOPHIL # BLD AUTO: 0.1 10E3/UL (ref 0–0.7)
EOSINOPHIL NFR BLD AUTO: 1 %
ERYTHROCYTE [DISTWIDTH] IN BLOOD BY AUTOMATED COUNT: 12.9 % (ref 10–15)
HCT VFR BLD AUTO: 32.2 % (ref 35–47)
HGB BLD-MCNC: 11 G/DL (ref 11.7–15.7)
IMM GRANULOCYTES # BLD: 0.1 10E3/UL
IMM GRANULOCYTES NFR BLD: 1 %
LYMPHOCYTES # BLD AUTO: 1.8 10E3/UL (ref 0.8–5.3)
LYMPHOCYTES NFR BLD AUTO: 18 %
MCH RBC QN AUTO: 31.1 PG (ref 26.5–33)
MCHC RBC AUTO-ENTMCNC: 34.2 G/DL (ref 31.5–36.5)
MCV RBC AUTO: 91 FL (ref 78–100)
MONOCYTES # BLD AUTO: 0.4 10E3/UL (ref 0–1.3)
MONOCYTES NFR BLD AUTO: 4 %
NEUTROPHILS # BLD AUTO: 7.5 10E3/UL (ref 1.6–8.3)
NEUTROPHILS NFR BLD AUTO: 76 %
NRBC # BLD AUTO: 0 10E3/UL
NRBC BLD AUTO-RTO: 0 /100
PLATELET # BLD AUTO: 153 10E3/UL (ref 150–450)
RBC # BLD AUTO: 3.54 10E6/UL (ref 3.8–5.2)
SPECIMEN EXPIRATION DATE: NORMAL
WBC # BLD AUTO: 9.9 10E3/UL (ref 4–11)

## 2022-08-15 PROCEDURE — 250N000013 HC RX MED GY IP 250 OP 250 PS 637: Performed by: OBSTETRICS & GYNECOLOGY

## 2022-08-15 PROCEDURE — 86901 BLOOD TYPING SEROLOGIC RH(D): CPT | Performed by: OBSTETRICS & GYNECOLOGY

## 2022-08-15 PROCEDURE — 258N000003 HC RX IP 258 OP 636: Performed by: OBSTETRICS & GYNECOLOGY

## 2022-08-15 PROCEDURE — 36415 COLL VENOUS BLD VENIPUNCTURE: CPT | Performed by: OBSTETRICS & GYNECOLOGY

## 2022-08-15 PROCEDURE — 85025 COMPLETE CBC W/AUTO DIFF WBC: CPT | Performed by: OBSTETRICS & GYNECOLOGY

## 2022-08-15 PROCEDURE — 250N000011 HC RX IP 250 OP 636: Performed by: OBSTETRICS & GYNECOLOGY

## 2022-08-15 RX ORDER — CEPHALEXIN 500 MG/1
500 CAPSULE ORAL 4 TIMES DAILY
Qty: 32 CAPSULE | Refills: 0 | Status: SHIPPED | OUTPATIENT
Start: 2022-08-15

## 2022-08-15 RX ADMIN — SENNOSIDES AND DOCUSATE SODIUM 1 TABLET: 50; 8.6 TABLET ORAL at 07:57

## 2022-08-15 RX ADMIN — SODIUM CHLORIDE, POTASSIUM CHLORIDE, SODIUM LACTATE AND CALCIUM CHLORIDE: 600; 310; 30; 20 INJECTION, SOLUTION INTRAVENOUS at 02:39

## 2022-08-15 RX ADMIN — ACETAMINOPHEN 650 MG: 325 TABLET ORAL at 10:23

## 2022-08-15 RX ADMIN — CEFTRIAXONE SODIUM 1 G: 1 INJECTION, POWDER, FOR SOLUTION INTRAMUSCULAR; INTRAVENOUS at 09:52

## 2022-08-15 RX ADMIN — OXYCODONE HYDROCHLORIDE 5 MG: 5 TABLET ORAL at 07:08

## 2022-08-15 RX ADMIN — PANTOPRAZOLE SODIUM 20 MG: 20 TABLET, DELAYED RELEASE ORAL at 07:08

## 2022-08-15 RX ADMIN — ASPIRIN 81 MG CHEWABLE TABLET 81 MG: 81 TABLET CHEWABLE at 07:57

## 2022-08-15 ASSESSMENT — ACTIVITIES OF DAILY LIVING (ADL)
ADLS_ACUITY_SCORE: 18

## 2022-08-15 NOTE — DISCHARGE INSTRUCTIONS
Discharge Instruction for Undelivered Patients      You were seen for:  flank pain  We Consulted: Emergency Services then Dr. Ivett Lopez (OB/GYN)  You had (Test or Medicine):IV antibiotics, IV fluids, fetal monitoring     Diet:   Drink 8 to 12 glasses of liquids (milk, juice, water) every day.  You may eat meals and snacks.     Activity:  Call your doctor or nurse midwife if your baby is moving less than usual.     Call your provider if you notice:  Swelling in your face or increased swelling in your hands or legs.  Headaches that are not relieved by Tylenol (acetaminophen).  Changes in your vision (blurring: seeing spots or stars.)  Nausea (sick to your stomach) and vomiting (throwing up).   Weight gain of 5 pounds or more per week.  Heartburn that doesn't go away.  Signs of bladder infection: pain when you urinate (use the toilet), need to go more often and more urgently.  The bag of vega (rupture of membranes) breaks, or you notice leaking in your underwear.  Bright red blood in your underwear.  Abdominal (lower belly) or stomach pain.  For first baby: Contractions (tightening) less than 5 minutes apart for one hour or more.  Second (plus) baby: Contractions (tightening) less than 10 minutes apart and getting stronger.  *If less than 34 weeks: Contractions (tightening) more than 6 times in one hour.  Increase or change in vaginal discharge (note the color and amount)  Other: IF severe pain returns    Follow-up:  As scheduled in the clinic      Take antibiotics as prescribed.

## 2022-08-15 NOTE — PLAN OF CARE
Pt feeling better this evening.  Good pain control with Oxycodone and Tylenol.  Pt had a episode of nausea.  IV fluids given per pt request due to difficulty with fluid intake without feeling nauseous.  Monitoring output.  Problem: Plan of Care - These are the overarching goals to be used throughout the patient stay.    Goal: Absence of Hospital-Acquired Illness or Injury  Intervention: Prevent and Manage VTE (Venous Thromboembolism) Risk  Recent Flowsheet Documentation  Taken 8/14/2022 2020 by Shima Maldonado, RN  VTE Prevention/Management: SCDs (sequential compression devices) on  Taken 8/14/2022 1635 by Shima Maldonado RN  VTE Prevention/Management: SCDs (sequential compression devices) on  Goal: Optimal Comfort and Wellbeing  Intervention: Monitor Pain and Promote Comfort  Recent Flowsheet Documentation  Taken 8/14/2022 1631 by Shima Maldonado, RN  Pain Management Interventions: medication (see MAR)  Taken 8/14/2022 1340 by Shima Maldonado, RN  Pain Management Interventions: declines  Goal: Readiness for Transition of Care  Intervention: Mutually Develop Transition Plan  Recent Flowsheet Documentation  Taken 8/14/2022 1137 by Shima Maldonado, RN  Equipment Currently Used at Home: none

## 2022-08-15 NOTE — PROGRESS NOTES
[facility] Labor and Delivery Progress Note    Carlitos Melendez MRN# [id number]   Age: 37 year old YOB: 1985           Subjective:     Contractions: none  Leakage of fluids: none  Flank pain resolved          Objective:   Patient Vitals for the past 8 hrs:   BP Temp Temp src Pulse Resp   08/15/22 0800 118/64 98.2  F (36.8  C) Temporal -- 16   08/15/22 0554 101/57 97.7  F (36.5  C) Temporal 70 16            Membranes: Intact     Fetal Heart Rate:    Monitor:      Variability:      Baseline Rate:      Fetal Heart Rate Tracing: appropriate for gestational age          Assessment:   Carlitos Melendez is a 37 year old female who is 26w1d  pyleo  Symptoms improving  Will discharge keflex 500 q6 x 10 days  Per dr jolley          Plan:         Sudheer Barrera MD, MD

## 2022-08-15 NOTE — PLAN OF CARE
Pt with improved symptoms.  Pain level rated a 1 or 2 out of 10.  IV Rocephin given.  Blood type O positive no RhoGam needed.  Prescription for oral antibiotics filled here at the hospital and given to pt after reviewing instructions.  Discharge instructions reviewed.  Pt has appointment with her OB/GYN on August 26th.  Pt discharged to home with , ambulatory at 10:35 am.

## 2022-08-15 NOTE — PROVIDER NOTIFICATION
08/14/22 2246   Provider Notification   Provider Name/Title Dr. Lopez   Method of Notification Electronic Page   Request Evaluate - Remote       Dr. Lopez responded to page.  Updated on Intake and Output. Urine output from [id number] is    12.5 ml/hr.  Reviewed UA and lab levels from earlier today.  VSS 99% on room air.  Lung sounds clear.  Orders to give another 500 cc bolus of LR.  Update with any concerns during the night.

## 2022-08-16 ENCOUNTER — PATIENT OUTREACH (OUTPATIENT)
Dept: CARE COORDINATION | Facility: CLINIC | Age: 37
End: 2022-08-16

## 2022-08-16 DIAGNOSIS — Z71.89 OTHER SPECIFIED COUNSELING: ICD-10-CM

## 2022-08-16 NOTE — PROGRESS NOTES
Clinic Care Coordination Contact  [facility]/Voicemail       Clinical Data: Care Coordinator Outreach  Outreach attempted x 1.  Left message on patient's voicemail with call back information and requested return call.  Plan:  Care Coordinator will try to reach patient again in 1-2 business days.    Nessa Ibarra  Care Transitions Assistant  [facility]

## 2022-08-17 NOTE — PROGRESS NOTES
Clinic Care Coordination Contact  [facility]/Voicemail    Clinical Data: Care Coordinator Outreach  Outreach attempted x 2. Left message on patient's voicemail with call back information and requested return call.    Plan:Care Coordinator will do no further outreaches at this time.    JORGE LUIS Mckinnon  [phone]  [facility]

## 2022-09-07 NOTE — DISCHARGE SUMMARY
[facility] Discharge Summary    Carlitos Melendez MRN# [id number]   Age: 37 year old YOB: 1985     Date of Admission:  8/14/2022  Date of Discharge::  8/15/2022 10:33 AM   Admitting Physician:  Ivett Lopez MD  Discharge Physician:  Ivett Lopez MD             Admission Diagnoses:   Flank pain [R10.9]  Pyelonephritis affecting pregnancy in second trimester [O23.02]            Discharge Diagnosis:   Flank pain  Pyelonephritis affecting pregnancy in second trimester           Procedures:   Procedure(s):  IV hydration and IV antibiotics       Imaging performed:   Renal ultrasound              Medications Prior to Admission:     No medications prior to admission.             Discharge Medications:     Discharge Medication List as of 8/15/2022 10:03 AM      START taking these medications    Details   cephALEXin (KEFLEX) 500 MG capsule Take 1 capsule (500 mg) by mouth 4 times daily, Disp-32 capsule, R-0, Local Print         CONTINUE these medications which have NOT CHANGED    Details   alum & mag hydroxide-simethicone (MAALOX) 200-200-20 MG/5ML SUSP suspension Take 30 mLs by mouth every 4 hours as needed for indigestion, Historical      aspirin 81 MG EC tablet Take 81 mg by mouth as needed, Historical      pantoprazole (PROTONIX) 40 MG EC tablet Take 40 mg by mouth daily, Historical      simethicone (MYLICON) 80 MG chewable tablet Take 80 mg by mouth as needed for intestinal gas Strength unknown, Historical                   Consultations:   No consultations were requested during this admission          Brief History of Illness:   38 yo at 26 weeks gestation admitted with left flank pain and symptoms consistent with pyelonephritis.  She was admitted for IV antibiotics.  She was treated with IV ceftriaxone. She also received IV hydration during her admission.             Hospital Course:   The patient's hospital course was unremarkable.  She noted considerable clinical improvement within 24 hours.   Fetal monitoring was reassuring during her stay.  She was stable for hospital discharge home on oral antibiotics.         Discharge Instructions and Follow-Up:   Discharge diet: Regular   Discharge activity: Activity as tolerated   Discharge follow-up: Follow up with primary care provider in 1-2 weeks              Discharge Disposition:   Discharged to home      Attestation:  I have reviewed today's vital signs, notes, medications, labs and imaging.  Amount of time performed on this discharge summary: 10 minutes.    Ivett Lopez MD

## 2022-10-10 ENCOUNTER — TRANSCRIBE ORDERS (OUTPATIENT)
Dept: OTHER | Age: 37
End: 2022-10-10

## 2022-10-10 DIAGNOSIS — M54.30 SCIATICA, UNSPECIFIED LATERALITY: Primary | ICD-10-CM

## 2022-10-31 ENCOUNTER — THERAPY VISIT (OUTPATIENT)
Dept: PHYSICAL THERAPY | Facility: CLINIC | Age: 37
End: 2022-10-31
Attending: OBSTETRICS & GYNECOLOGY
Payer: COMMERCIAL

## 2022-10-31 DIAGNOSIS — M54.30 SCIATICA, UNSPECIFIED LATERALITY: ICD-10-CM

## 2022-10-31 DIAGNOSIS — M54.16 LEFT LUMBAR RADICULOPATHY: ICD-10-CM

## 2022-10-31 PROCEDURE — 97161 PT EVAL LOW COMPLEX 20 MIN: CPT | Mod: GP | Performed by: PHYSICAL THERAPIST

## 2022-10-31 PROCEDURE — 97112 NEUROMUSCULAR REEDUCATION: CPT | Mod: GP | Performed by: PHYSICAL THERAPIST

## 2022-10-31 NOTE — PROGRESS NOTES
[facility]    OUTPATIENT Physical Therapy ORTHOPEDIC EVALUATION  PLAN OF TREATMENT FOR OUTPATIENT REHABILITATION  (COMPLETE FOR INITIAL CLAIMS ONLY)  Patient's Last Name, First Name, M.I.  YOB: 1985  Carlitos Melendez    Provider s Name:  [facility]   Medical Record No.  [id number]   Start of Care Date:  10/31/22   Onset Date:   08/31/22   Treatment Diagnosis:  L LBP/buttock/thigh Medical Diagnosis:     Sciatica, unspecified laterality  Left lumbar radiculopathy       Goals:     10/31/22 0500   Body Part   Goals listed below are for L LBP/thigh   Goal #1   Goal #1 sitting   Previous Functional Level No restrictions   Current Functional Level Minutes patient can sit   Performance level 30   STG Target Performance Minutes patient will be able to sit   Performance level 60   Rationale to allow rest from standing   Due date 11/21/22   LTG Target Performance Hours patient will be able to sit   Performance Level 2   Rationale to allow rest from standing   Due date 01/02/23   Goal #2   Goal #2 standing   Previous Functional Level No restrictions   Current Functional Level Minutes patient can stand   Performance level 10   STG Target Performance Minutes patient will be able to stand   Performance level 20   Rationale for meal preparation;for housekeeping tasks such as vacuuming, bed making, mowing, gardening   Due date 11/21/22   LTG Target Performance Minutes patient will be able to stand   Performance Level 45   Rationale for housekeeping tasks such as vacuuming, bed making, mowing;for meal preparation   Due date 01/02/23       Therapy Frequency:  1x/week x 3 weeks; 2x/month x 6 weeks  Predicted Duration of Therapy Intervention:  9 weeks    Frances Holliday PT                 I CERTIFY THE NEED FOR THESE SERVICES FURNISHED UNDER        THIS PLAN OF TREATMENT AND WHILE  UNDER MY CARE .             Physician Signature               Date    X_____________________________________________________                         Certification Date From:  10/31/22   Certification Date To:  01/02/23    Referring Provider:  Lashanda Hurtado    Initial Assessment        See Epic Evaluation SOC Date: 10/31/22

## 2022-10-31 NOTE — PROGRESS NOTES
"Physical Therapy Initial Evaluation  Subjective:  The history is provided by the patient.   Patient Health History  Carlitos Melendez being seen for Low back/leg pain.     Problem began: 2022.   Problem occurred: pregnancy   Pain is reported as 6/10 on pain scale.  General health as reported by patient is fair.  Pertinent medical history includes: none.   Red flags:  None as reported by patient.   Other medical allergies details: penicillin.   Surgeries include:  None.     Other medications details: malox, keflex.    Current occupation is security/PCA.   Primary job tasks include:  Prolonged standing.                  Therapist Generated HPI Evaluation  Problem details: Onset of L LBP, buttock, thigh 24 for unknown reasons during pregnancy. Pain with most movements. Expecting her 7th child, scheduled for  on 11-15-22. Has six kids ages 5-16. 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 \"I feel like I can't do anything. I feel worthless. I don't feel myself.\".         Type of problem:  Lumbar.    This is a new condition.  Condition occurred with:  Insidious onset.  Where condition occurred: at home (during pregnancy).  Patient reports pain:  Lumbar spine left.  Pain is described as aching and sharp and is intermittent.  Pain radiates to:  Gluteals left and thigh left. Pain is worse during the day.  Since onset symptoms are unchanged.  Symptoms are exacerbated by sitting, bending, walking, lifting and lying down  and relieved by rest and heat.      Restrictions due to condition include:  Currently not working due to present treatment.  Barriers include:  None as reported by patient.                        Objective:  System         Lumbar/SI Evaluation  ROM:    AROM Lumbar:   Flexion:          Hands to toes  Ext:                    50%   Side Bend:        Left:     Right:   Rotation:           Left:     Right:   Side Glide:        Left:  100%    Right:  100% (L LBP)      "                                                                      Trip Lumbar Evaluation    Posture:  Sitting: poor  Standing: fair  Lordosis: WNL  Lateral Shift: no  Correction of Posture: better      Test Movements:    EIS: During: no effect    Repeat EIS: During: no effect  After: no effect  Mechanical Response: IncROM                                                     ROS    Assessment/Plan:    Patient is a 37 year old female with lumbar complaints.    Patient has the following significant findings with corresponding treatment plan.                Diagnosis 1:  L LBP/buttock/thigh  Pain -  self management, education, directional preference exercise and home program  Decreased ROM/flexibility - manual therapy and therapeutic exercise  Decreased function - therapeutic activities  Impaired posture - neuro re-education    Therapy Evaluation Codes:   Cumulative Therapy Evaluation is: Low complexity.    Previous and current functional limitations:  (See Goal Flow Sheet for this information)    Short term and Long term goals: (See Goal Flow Sheet for this information)     Communication ability:  Patient appears to be able to clearly communicate and understand verbal and written communication and follow directions correctly.  Treatment Explanation - The following has been discussed with the patient:   RX ordered/plan of care  Anticipated outcomes  Possible risks and side effects  This patient would benefit from PT intervention to resume normal activities.   Rehab potential is good.    Frequency:  1 X week, once daily  Duration:  for 3 weeks tapering to 2 X a month over 6 weeks  Discharge Plan:  Achieve all LTG.  Independent in home treatment program.  Reach maximal therapeutic benefit.    Please refer to the daily flowsheet for treatment today, total treatment time and time spent performing 1:1 timed codes.

## 2022-12-12 PROBLEM — M54.16 LEFT LUMBAR RADICULOPATHY: Status: RESOLVED | Noted: 2022-10-31 | Resolved: 2022-12-12

## 2023-01-14 ENCOUNTER — HEALTH MAINTENANCE LETTER (OUTPATIENT)
Age: 38
End: 2023-01-14

## 2023-04-23 ENCOUNTER — HEALTH MAINTENANCE LETTER (OUTPATIENT)
Age: 38
End: 2023-04-23

## 2024-06-30 ENCOUNTER — HEALTH MAINTENANCE LETTER (OUTPATIENT)
Age: 39
End: 2024-06-30

## 2024-09-18 ENCOUNTER — APPOINTMENT (OUTPATIENT)
Dept: GENERAL RADIOLOGY | Facility: CLINIC | Age: 39
End: 2024-09-18
Attending: EMERGENCY MEDICINE
Payer: COMMERCIAL

## 2024-09-18 ENCOUNTER — HOSPITAL ENCOUNTER (EMERGENCY)
Facility: CLINIC | Age: 39
Discharge: HOME | End: 2024-09-18
Attending: EMERGENCY MEDICINE | Admitting: EMERGENCY MEDICINE
Payer: COMMERCIAL

## 2024-09-18 VITALS
SYSTOLIC BLOOD PRESSURE: 124 MMHG | WEIGHT: 190 LBS | HEART RATE: 85 BPM | RESPIRATION RATE: 18 BRPM | BODY MASS INDEX: 31.65 KG/M2 | DIASTOLIC BLOOD PRESSURE: 93 MMHG | TEMPERATURE: 98.6 F | OXYGEN SATURATION: 100 % | HEIGHT: 65 IN

## 2024-09-18 DIAGNOSIS — R06.00 DYSPNEA, UNSPECIFIED TYPE: ICD-10-CM

## 2024-09-18 LAB
ANION GAP SERPL CALCULATED.3IONS-SCNC: 9 MMOL/L (ref 7–15)
ATRIAL RATE - MUSE: 70 BPM
BASOPHILS # BLD AUTO: 0 10E3/UL (ref 0–0.2)
BASOPHILS NFR BLD AUTO: 0 %
BUN SERPL-MCNC: 6.6 MG/DL (ref 6–20)
CALCIUM SERPL-MCNC: 8.8 MG/DL (ref 8.8–10.4)
CHLORIDE SERPL-SCNC: 107 MMOL/L (ref 98–107)
CREAT SERPL-MCNC: 0.65 MG/DL (ref 0.51–0.95)
DIASTOLIC BLOOD PRESSURE - MUSE: NORMAL MMHG
EGFRCR SERPLBLD CKD-EPI 2021: >90 ML/MIN/1.73M2
EOSINOPHIL # BLD AUTO: 0.1 10E3/UL (ref 0–0.7)
EOSINOPHIL NFR BLD AUTO: 1 %
ERYTHROCYTE [DISTWIDTH] IN BLOOD BY AUTOMATED COUNT: 16 % (ref 10–15)
GLUCOSE SERPL-MCNC: 106 MG/DL (ref 70–99)
HCO3 SERPL-SCNC: 25 MMOL/L (ref 22–29)
HCT VFR BLD AUTO: 35.7 % (ref 35–47)
HGB BLD-MCNC: 11.8 G/DL (ref 11.7–15.7)
HOLD SPECIMEN: NORMAL
HOLD SPECIMEN: NORMAL
IMM GRANULOCYTES # BLD: 0 10E3/UL
IMM GRANULOCYTES NFR BLD: 0 %
INTERPRETATION ECG - MUSE: NORMAL
LYMPHOCYTES # BLD AUTO: 1.4 10E3/UL (ref 0.8–5.3)
LYMPHOCYTES NFR BLD AUTO: 15 %
MCH RBC QN AUTO: 27.5 PG (ref 26.5–33)
MCHC RBC AUTO-ENTMCNC: 33.1 G/DL (ref 31.5–36.5)
MCV RBC AUTO: 83 FL (ref 78–100)
MONOCYTES # BLD AUTO: 0.6 10E3/UL (ref 0–1.3)
MONOCYTES NFR BLD AUTO: 6 %
NEUTROPHILS # BLD AUTO: 7.3 10E3/UL (ref 1.6–8.3)
NEUTROPHILS NFR BLD AUTO: 78 %
NRBC # BLD AUTO: 0 10E3/UL
NRBC BLD AUTO-RTO: 0 /100
P AXIS - MUSE: -1 DEGREES
PLATELET # BLD AUTO: 234 10E3/UL (ref 150–450)
POTASSIUM SERPL-SCNC: 3.6 MMOL/L (ref 3.4–5.3)
PR INTERVAL - MUSE: 134 MS
QRS DURATION - MUSE: 84 MS
QT - MUSE: 392 MS
QTC - MUSE: 423 MS
R AXIS - MUSE: -20 DEGREES
RBC # BLD AUTO: 4.29 10E6/UL (ref 3.8–5.2)
SODIUM SERPL-SCNC: 141 MMOL/L (ref 135–145)
SYSTOLIC BLOOD PRESSURE - MUSE: NORMAL MMHG
T AXIS - MUSE: 24 DEGREES
TROPONIN T SERPL HS-MCNC: <6 NG/L
VENTRICULAR RATE- MUSE: 70 BPM
WBC # BLD AUTO: 9.4 10E3/UL (ref 4–11)

## 2024-09-18 PROCEDURE — 82374 ASSAY BLOOD CARBON DIOXIDE: CPT | Performed by: EMERGENCY MEDICINE

## 2024-09-18 PROCEDURE — 85025 COMPLETE CBC W/AUTO DIFF WBC: CPT | Performed by: EMERGENCY MEDICINE

## 2024-09-18 PROCEDURE — 99285 EMERGENCY DEPT VISIT HI MDM: CPT | Mod: 25

## 2024-09-18 PROCEDURE — 250N000009 HC RX 250: Performed by: EMERGENCY MEDICINE

## 2024-09-18 PROCEDURE — 84484 ASSAY OF TROPONIN QUANT: CPT | Performed by: EMERGENCY MEDICINE

## 2024-09-18 PROCEDURE — 999N000157 HC STATISTIC RCP TIME EA 10 MIN

## 2024-09-18 PROCEDURE — 36415 COLL VENOUS BLD VENIPUNCTURE: CPT | Performed by: EMERGENCY MEDICINE

## 2024-09-18 PROCEDURE — 71046 X-RAY EXAM CHEST 2 VIEWS: CPT

## 2024-09-18 PROCEDURE — 93005 ELECTROCARDIOGRAM TRACING: CPT

## 2024-09-18 PROCEDURE — 999N000126 HC STATISTIC PEAK FLOW MEASUREMENT

## 2024-09-18 RX ORDER — FEXOFENADINE HCL AND PSEUDOEPHEDRINE HCI 60; 120 MG/1; MG/1
1 TABLET, EXTENDED RELEASE ORAL 2 TIMES DAILY
Qty: 30 TABLET | Refills: 0 | Status: SHIPPED | OUTPATIENT
Start: 2024-09-18

## 2024-09-18 RX ORDER — IPRATROPIUM BROMIDE AND ALBUTEROL SULFATE 2.5; .5 MG/3ML; MG/3ML
3 SOLUTION RESPIRATORY (INHALATION) ONCE
Status: COMPLETED | OUTPATIENT
Start: 2024-09-18 | End: 2024-09-18

## 2024-09-18 RX ORDER — FLUTICASONE PROPIONATE 50 MCG
1 SPRAY, SUSPENSION (ML) NASAL DAILY
Qty: 11.1 ML | Refills: 0 | Status: SHIPPED | OUTPATIENT
Start: 2024-09-18

## 2024-09-18 RX ADMIN — IPRATROPIUM BROMIDE AND ALBUTEROL SULFATE 3 ML: .5; 3 SOLUTION RESPIRATORY (INHALATION) at 07:44

## 2024-09-18 ASSESSMENT — COLUMBIA-SUICIDE SEVERITY RATING SCALE - C-SSRS
1. IN THE PAST MONTH, HAVE YOU WISHED YOU WERE DEAD OR WISHED YOU COULD GO TO SLEEP AND NOT WAKE UP?: NO
6. HAVE YOU EVER DONE ANYTHING, STARTED TO DO ANYTHING, OR PREPARED TO DO ANYTHING TO END YOUR LIFE?: NO
2. HAVE YOU ACTUALLY HAD ANY THOUGHTS OF KILLING YOURSELF IN THE PAST MONTH?: NO

## 2024-09-18 ASSESSMENT — ACTIVITIES OF DAILY LIVING (ADL): ADLS_ACUITY_SCORE: 35
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
## 2024-09-18 NOTE — DISCHARGE INSTRUCTIONS
Your oxygen levels respiratory rate chest x-ray and lab work are all normal.  You do have a polyp in the left nose.  Use Flonase daily.  Use Allegra-D to help with allergic rhinitis.  Please follow-up with your regular doctor breathing does not improve with time.  Thanks for your patience today.
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
## 2024-09-18 NOTE — PROGRESS NOTES
Peak flow pre-bronchodilator: 320 L/min    Peak flow post-bronchodilator: 340 L/min    SpO2 100% pre/post on room air. Duoneb used as bronchodilator.     Amy Little, RT  9/18/2024

## 2025-07-13 ENCOUNTER — HEALTH MAINTENANCE LETTER (OUTPATIENT)
Age: 40
End: 2025-07-13